# Patient Record
Sex: MALE | Race: BLACK OR AFRICAN AMERICAN | Employment: OTHER | URBAN - METROPOLITAN AREA
[De-identification: names, ages, dates, MRNs, and addresses within clinical notes are randomized per-mention and may not be internally consistent; named-entity substitution may affect disease eponyms.]

---

## 2018-11-29 ENCOUNTER — OFFICE VISIT (OUTPATIENT)
Dept: INTERNAL MEDICINE CLINIC | Age: 48
End: 2018-11-29

## 2018-11-29 VITALS
RESPIRATION RATE: 18 BRPM | DIASTOLIC BLOOD PRESSURE: 96 MMHG | HEIGHT: 65 IN | BODY MASS INDEX: 34.14 KG/M2 | TEMPERATURE: 97.8 F | SYSTOLIC BLOOD PRESSURE: 173 MMHG | WEIGHT: 204.9 LBS | HEART RATE: 81 BPM | OXYGEN SATURATION: 97 %

## 2018-11-29 DIAGNOSIS — E11.9 TYPE 2 DIABETES MELLITUS WITHOUT COMPLICATION, WITHOUT LONG-TERM CURRENT USE OF INSULIN (HCC): ICD-10-CM

## 2018-11-29 DIAGNOSIS — F32.A DEPRESSION, UNSPECIFIED DEPRESSION TYPE: ICD-10-CM

## 2018-11-29 DIAGNOSIS — Z13.31 SCREENING FOR DEPRESSION: ICD-10-CM

## 2018-11-29 DIAGNOSIS — R35.1 NOCTURIA: ICD-10-CM

## 2018-11-29 DIAGNOSIS — Z13.39 SCREENING FOR ALCOHOLISM: ICD-10-CM

## 2018-11-29 DIAGNOSIS — Z95.1 HX OF CABG: ICD-10-CM

## 2018-11-29 DIAGNOSIS — F31.9 BIPOLAR 1 DISORDER (HCC): ICD-10-CM

## 2018-11-29 DIAGNOSIS — Z00.00 MEDICARE ANNUAL WELLNESS VISIT, SUBSEQUENT: Primary | ICD-10-CM

## 2018-11-29 DIAGNOSIS — I10 ESSENTIAL HYPERTENSION: ICD-10-CM

## 2018-11-29 DIAGNOSIS — E78.00 HYPERCHOLESTEROLEMIA: ICD-10-CM

## 2018-11-29 DIAGNOSIS — Z12.11 SCREEN FOR COLON CANCER: ICD-10-CM

## 2018-11-29 RX ORDER — ISOSORBIDE MONONITRATE 30 MG/1
30 TABLET, EXTENDED RELEASE ORAL
COMMUNITY
Start: 2017-12-16 | End: 2018-11-29

## 2018-11-29 RX ORDER — VALSARTAN AND HYDROCHLOROTHIAZIDE 160; 12.5 MG/1; MG/1
1 TABLET, FILM COATED ORAL
COMMUNITY
End: 2018-11-29

## 2018-11-29 RX ORDER — OLMESARTAN MEDOXOMIL AND HYDROCHLOROTHIAZIDE 40/12.5 40; 12.5 MG/1; MG/1
1 TABLET ORAL DAILY
Qty: 30 TAB | Refills: 11 | Status: SHIPPED | OUTPATIENT
Start: 2018-11-29 | End: 2019-01-09 | Stop reason: CLARIF

## 2018-11-29 RX ORDER — INSULIN LISPRO 100 [IU]/ML
10 INJECTION, SUSPENSION SUBCUTANEOUS
Qty: 2 ADJUSTABLE DOSE PRE-FILLED PEN SYRINGE | Refills: 11 | Status: SHIPPED | OUTPATIENT
Start: 2018-11-29 | End: 2018-12-01 | Stop reason: SDUPTHER

## 2018-11-29 RX ORDER — METOPROLOL TARTRATE 50 MG/1
50 TABLET ORAL
COMMUNITY
Start: 2017-12-15 | End: 2018-11-29 | Stop reason: SDUPTHER

## 2018-11-29 RX ORDER — SILDENAFIL 100 MG/1
100 TABLET, FILM COATED ORAL AS NEEDED
Qty: 10 TAB | Refills: 11 | Status: SHIPPED | OUTPATIENT
Start: 2018-11-29 | End: 2019-09-17 | Stop reason: SINTOL

## 2018-11-29 RX ORDER — ATORVASTATIN CALCIUM 80 MG/1
80 TABLET, FILM COATED ORAL
COMMUNITY
End: 2018-11-29 | Stop reason: SDUPTHER

## 2018-11-29 RX ORDER — METOPROLOL TARTRATE 50 MG/1
50 TABLET ORAL 2 TIMES DAILY
Qty: 60 TAB | Refills: 11 | Status: SHIPPED | OUTPATIENT
Start: 2018-11-29 | End: 2019-09-17

## 2018-11-29 RX ORDER — INSULIN LISPRO 100 [IU]/ML
INJECTION, SOLUTION INTRAVENOUS; SUBCUTANEOUS
COMMUNITY
End: 2018-11-29 | Stop reason: CLARIF

## 2018-11-29 RX ORDER — GUAIFENESIN 100 MG/5ML
81 LIQUID (ML) ORAL
COMMUNITY
End: 2019-01-29

## 2018-11-29 RX ORDER — ATORVASTATIN CALCIUM 80 MG/1
80 TABLET, FILM COATED ORAL DAILY
Qty: 30 TAB | Refills: 11 | Status: SHIPPED | OUTPATIENT
Start: 2018-11-29

## 2018-11-29 NOTE — PROGRESS NOTES
SPORTS MEDICINE AND PRIMARY CARE Rao Haji MD, [address] [address] Phone:  [phone]  Fax: [phone] Chief Complaint Patient presents with [facility]i Annual Wellness Visit Stephani Conchis SUBJECTIVE: 
  Demetri Giron is a 46 y.o. male Patient returns today and is seen as a new patient for evaluation and ongoing care. He has a known history of bipolar I disorder, depression, diabetes, dyslipidemia, primary hypertension and is seen for evaluation. Patient comes in with his wife without new complaints and is seen for evaluation. Current Outpatient Medications Medication Sig Dispense Refill  aspirin 81 mg chewable tablet Take 81 mg by mouth.  insulin lispro  & lisp protamine, human, (HUMALOG MIX 75-25 KWIKPEN) 100 unit/mL (75-25) inpn 10 Units by SubCUTAneous route Before breakfast and dinner. 2 Adjustable Dose Pre-filled Pen Syringe 11  
 olmesartan-hydroCHLOROthiazide (BENICAR HCT) 40-12.5 mg per tablet Take 1 Tab by mouth daily. 30 Tab 11  
 sildenafil citrate (VIAGRA) 100 mg tablet Take 1 Tab by mouth as needed. 10 Tab 11  
 metoprolol tartrate (LOPRESSOR) 50 mg tablet Take 1 Tab by mouth two (2) times a day. 60 Tab 11  
 atorvastatin (LIPITOR) 80 mg tablet Take 1 Tab by mouth daily. 30 Tab 11 Past Medical History:  
Diagnosis Date  Bipolar 1 disorder ([facility]  Depression  Diabetes ([facility] 1995  Hx of CABG 2007  
 [facility] news  Hypercholesterolemia  Hypertension  PVD (peripheral vascular disease) ([facility] Past Surgical History:  
Procedure Laterality Date  HX APPENDECTOMY Allergies Allergen Reactions  Pcn [Penicillins] Itching REVIEW OF SYSTEMS: 
General: negative for - chills or fever ENT: negative for - headaches, nasal congestion or tinnitus Respiratory: negative for - cough, hemoptysis, shortness of breath or wheezing Cardiovascular : negative for - chest pain, edema, palpitations or shortness of breath Gastrointestinal: negative for - abdominal pain, blood in stools, heartburn or nausea/vomiting Genito-Urinary: no dysuria, trouble voiding, or hematuria Musculoskeletal: negative for - gait disturbance, joint pain, joint stiffness or joint swelling Neurological: no TIA or stroke symptoms Hematologic: no bruises, no bleeding, no swollen glands Integument: no lumps, mole changes, nail changes or rash Endocrine: no malaise/lethargy or unexpected weight changes Social History Socioeconomic History  Marital status:  Spouse name: Not on file  Number of children: Not on file  Years of education: Not on file  Highest education level: Not on file Tobacco Use  Smoking status: Never Smoker  Smokeless tobacco: Never Used Substance and Sexual Activity  Alcohol use: No  
  Frequency: Never  Drug use: No  
 Sexual activity: Yes  
  Partners: Female Birth control/protection: None Family History Problem Relation Age of Onset  Hypertension Mother  Diabetes Mother  Heart Disease Mother  Hypertension Father  Stroke Father   
Habits:  Lifetime nonsmoker, non drinker. Used crack cocaine 30 or more years ago. Social History:  The patient is , lives with his wife. He has a 32year old daughter and one grandson. He completed high school. He is a retired . Currently he is gainfully employed as a  at [employer]. Family History:  Father  52 of stroke. Mother  64 with diabetes and heart condition. 52year old brother  of a ruptured brain aneurysm. 50year old brother is alive and well. OBJECTIVE: 
 
Visit Vitals BP (!) 173/96 Pulse 81 Temp 97.8 °F (36.6 °C) (Oral) Resp 18 Ht 5' 5\" (1.651 m) Wt 204 lb 14.4 oz (92.9 kg) SpO2 97% BMI 34.10 kg/m² CONSTITUTIONAL: well , well nourished, appears age appropriate EYES: perrla, eom intact ENMT:moist mucous membranes, pharynx clear NECK: supple. Thyroid normal 
RESPIRATORY: Chest: clear bilaterally CARDIOVASCULAR: Heart: regular rate and rhythm GASTROINTESTINAL: Abdomen: soft, bowel sounds active HEMATOLOGIC: no pathological lymph nodes palpated MUSCULOSKELETAL: Extremities: no edema, pulse 1+ Foot exam reveals no lesions, pulses are absent, sensation is intact to fine filament. INTEGUMENT: No unusual rashes or suspicious skin lesions noted. Nails appear normal. 
NEUROLOGIC: non-focal exam  
MENTAL STATUS: alert and oriented, appropriate affect ASSESSMENT: 
1. Medicare annual wellness visit, subsequent 2. Screening for alcoholism 3. Screening for depression 4. Type 2 diabetes mellitus without complication, without long-term current use of insulin ([facility] 5. Hypercholesterolemia 6. Essential hypertension 7. Bipolar 1 disorder ([facility] 8. Depression, unspecified depression type 9. Hx of CABG 10. Screen for colon cancer 11. Nocturia Her blood pressure control is atrocious. It is related to compliance issues. We will discontinue the Valsartan and place him on Benicar and ask him to come back in a week for a blood pressure check. We will assess blood sugar control with a Hgb A1c, as well as metabolic status, and report to him the results in the mail. When he comes back for his blood pressure check he will be bringing his blood sugar readings so we can help him adjust his insulin to induce a goal range blood sugar of between 110 and 140. We see that he is on a statin and we will titrate that for cholesterol control, but he has been out of all his medicines for at least a month, which we keep in mind as we look at his laboratory studies.   We will give him a glucose meter, but he will call his insurance company to find out which one they use. He will then call Shola Valdovinos back with the result and if it is not the one that we use we have given him he will bring that one back and we will call in a prescription for the one that insurance will pay for. We discuss the use of nitrates and Viagra . He prefers to stop the nitrates. He is not symptomatic with chest pain and so it is a comfortable decision for us at this time. Dr. Leni Rivers will speak on it further. I have discussed the diagnosis with the patient and the intended plan as seen in the 
orders above. The patient understands and agees with the plan. The patient has  
received an after visit summary and questions were answered concerning 
future plans Patient labs and/or xrays were reviewed Past records were reviewed. PLAN: 
. Orders Placed This Encounter  Depression Screen Annual  
 URINALYSIS W/ RFLX MICROSCOPIC  CBC WITH AUTOMATED DIFF  
 METABOLIC PANEL, COMPREHENSIVE  LIPID PANEL  
 PROSTATE SPECIFIC AG  
 HEMOGLOBIN A1C WITH EAG  
 REFERRAL FOR COLONOSCOPY  [facility]  
 AMB POC EKG ROUTINE W/ 12 LEADS, INTER & REP  
 DISCONTD: isosorbide mononitrate ER (IMDUR) 30 mg tablet  DISCONTD: metoprolol tartrate (LOPRESSOR) 50 mg tablet  DISCONTD: valsartan-hydroCHLOROthiazide (DIOVAN-HCT) 160-12.5 mg per tablet  aspirin 81 mg chewable tablet  DISCONTD: insulin lispro (HUMALOG KWIKPEN INSULIN) 100 unit/mL kwikpen  DISCONTD: atorvastatin (LIPITOR) 80 mg tablet  insulin lispro  & lisp protamine, human, (HUMALOG MIX 75-25 KWIKPEN) 100 unit/mL (75-25) inpn  olmesartan-hydroCHLOROthiazide (BENICAR HCT) 40-12.5 mg per tablet  sildenafil citrate (VIAGRA) 100 mg tablet  metoprolol tartrate (LOPRESSOR) 50 mg tablet  atorvastatin (LIPITOR) 80 mg tablet Follow-up Disposition: 
Return in about 1 week (around 12/6/2018) for bp check, blood sugar check.   
 
 
 
 
ATTENTION:  
 This medical record was transcribed using an electronic medical records system. Although proofread, it may and can contain electronic and spelling errors. Other human spelling and other errors may be present. Corrections may be executed at a later time. Please feel free to contact us for any clarifications as needed.

## 2018-11-29 NOTE — PROGRESS NOTES
1. Have you been to the ER, urgent care clinic since your last visit? Hospitalized since your last visit? No 
 
2. Have you seen or consulted any other health care providers outside of the [address] [place] [address] since your last visit? Include any pap smears or colon screening. No 
 
This is a \"Welcome to [employer]"  Initial Preventive Physical Examination (IPPE) providing Personalized Prevention Plan Services (Performed in the first 12 months of enrollment) I have reviewed the patient's medical history in detail and updated the computerized patient record. History Past Medical History:  
Diagnosis Date  Bipolar 1 disorder ([facility]  Depression  Diabetes ([facility]  Hypercholesterolemia  Hypertension Past Surgical History:  
Procedure Laterality Date  HX APPENDECTOMY Current Outpatient Medications Medication Sig Dispense Refill  isosorbide mononitrate ER (IMDUR) 30 mg tablet Take 30 mg by mouth.  metoprolol tartrate (LOPRESSOR) 50 mg tablet Take 50 mg by mouth.  valsartan-hydroCHLOROthiazide (DIOVAN-HCT) 160-12.5 mg per tablet Take 1 Tab by mouth.  aspirin 81 mg chewable tablet Take 81 mg by mouth.  insulin lispro (HUMALOG KWIKPEN INSULIN) 100 unit/mL kwikpen by SubCUTAneous route.  atorvastatin (LIPITOR) 80 mg tablet Take 80 mg by mouth. Allergies Allergen Reactions  Pcn [Penicillins] Itching No family history on file. Social History Tobacco Use  Smoking status: Not on file Substance Use Topics  Alcohol use: Not on file Diet, Lifestyle: No special diet Exercise level: moderately active Depression Risk Screen PHQ over the last two weeks 11/29/2018 Little interest or pleasure in doing things Not at all Feeling down, depressed, irritable, or hopeless Not at all Total Score PHQ 2 0 Alcohol Risk Screen You do not drink alcohol or very rarely. Functional Ability and Level of Safety Hearing Loss Hearing is good. Vision Screening Vision is good. No exam data present Activities of Daily Living The home contains: no safety equipment. Patient does total self care Fall Risk Screen No flowsheet data found. Abuse Screen Patient is not abused Screening EKG EKG order placed: Yes Patient Care Team  
Patient Care Team: 
Aracelis Plascencia MD as PCP - General (Internal Medicine) End of Life Planning Advanced care planning directives were discussed with the patient and /or family/caregiver. Assessment/Plan Education and counseling provided: 
Are appropriate based on today's review and evaluation Health Maintenance Due Topic Date Due  
 COLONOSCOPY  01/11/1986  MEDICARE YEARLY EXAM  11/26/2018

## 2018-11-29 NOTE — PATIENT INSTRUCTIONS
Medicare Wellness Visit, Male The best way to live healthy is to have a lifestyle where you eat a well-balanced diet, exercise regularly, limit alcohol use, and quit all forms of tobacco/nicotine, if applicable. Regular preventive services are another way to keep healthy. Preventive services (vaccines, screening tests, monitoring & exams) can help personalize your care plan, which helps you manage your own care. Screening tests can find health problems at the earliest stages, when they are easiest to treat. [address] [facility] follows the current, evidence-based guidelines published by the [facility] Ellis ([facility]) when recommending preventive services for our patients. Because we follow these guidelines, sometimes recommendations change over time as research supports it. (For example, a prostate screening blood test is no longer routinely recommended for men with no symptoms.) Of course, you and your doctor may decide to screen more often for some diseases, based on your risk and co-morbidities (chronic disease you are already diagnosed with). Preventive services for you include: - Medicare offers their members a free annual wellness visit, which is time for you and your primary care provider to discuss and plan for your preventive service needs. Take advantage of this benefit every year! 
-All adults over age 72 should receive the recommended pneumonia vaccines. Current USPSTF guidelines recommend a series of two vaccines for the best pneumonia protection.  
-All adults should have a flu vaccine yearly and an ECG.  All adults age 61 and older should receive a shingles vaccine once in their lifetime.   
-All adults age 38-68 who are overweight should have a diabetes screening test once every three years.  
-Other screening tests & preventive services for persons with diabetes include: an eye exam to screen for diabetic retinopathy, a kidney function test, a foot exam, and stricter control over your cholesterol.  
-Cardiovascular screening for adults with routine risk involves an electrocardiogram (ECG) at intervals determined by the provider.  
-Colorectal cancer screening should be done for adults age 54-65 with no increased risk factors for colorectal cancer. There are a number of acceptable methods of screening for this type of cancer. Each test has its own benefits and drawbacks. Discuss with your provider what is most appropriate for you during your annual wellness visit. The different tests include: colonoscopy (considered the best screening method), a fecal occult blood test, a fecal DNA test, and sigmoidoscopy. 
-All adults born between [facility] should be screened once for Hepatitis C. 
-An Abdominal Aortic Aneurysm (AAA) Screening is recommended for men age 73-68 who has ever smoked in their lifetime. Here is a list of your current Health Maintenance items (your personalized list of preventive services) with a due date: 
Health Maintenance Due Topic Date Due  
 Colonoscopy  01/11/1986 Bartolo Ibarra Annual Well Visit  11/26/2018

## 2018-11-30 LAB
ALBUMIN SERPL-MCNC: 4.1 G/DL (ref 3.5–5.5)
ALBUMIN/GLOB SERPL: 1.6 {RATIO} (ref 1.2–2.2)
ALP SERPL-CCNC: 81 IU/L (ref 39–117)
ALT SERPL-CCNC: 23 IU/L (ref 0–44)
APPEARANCE UR: CLEAR
AST SERPL-CCNC: 17 IU/L (ref 0–40)
BASOPHILS # BLD AUTO: 0 X10E3/UL (ref 0–0.2)
BASOPHILS NFR BLD AUTO: 0 %
BILIRUB SERPL-MCNC: 0.4 MG/DL (ref 0–1.2)
BILIRUB UR QL STRIP: NEGATIVE
BUN SERPL-MCNC: 13 MG/DL (ref 6–24)
BUN/CREAT SERPL: 13 (ref 9–20)
CALCIUM SERPL-MCNC: 9.1 MG/DL (ref 8.7–10.2)
CHLORIDE SERPL-SCNC: 97 MMOL/L (ref 96–106)
CHOLEST SERPL-MCNC: 173 MG/DL (ref 100–199)
CO2 SERPL-SCNC: 27 MMOL/L (ref 20–29)
COLOR UR: YELLOW
CREAT SERPL-MCNC: 1.04 MG/DL (ref 0.76–1.27)
EOSINOPHIL # BLD AUTO: 0.5 X10E3/UL (ref 0–0.4)
EOSINOPHIL NFR BLD AUTO: 8 %
ERYTHROCYTE [DISTWIDTH] IN BLOOD BY AUTOMATED COUNT: 13.9 % (ref 12.3–15.4)
EST. AVERAGE GLUCOSE BLD GHB EST-MCNC: 235 MG/DL
GLOBULIN SER CALC-MCNC: 2.5 G/DL (ref 1.5–4.5)
GLUCOSE SERPL-MCNC: 254 MG/DL (ref 65–99)
GLUCOSE UR QL: NEGATIVE
HBA1C MFR BLD: 9.8 % (ref 4.8–5.6)
HCT VFR BLD AUTO: 46.6 % (ref 37.5–51)
HDLC SERPL-MCNC: 44 MG/DL
HGB BLD-MCNC: 15.7 G/DL (ref 13–17.7)
HGB UR QL STRIP: NEGATIVE
IMM GRANULOCYTES # BLD: 0 X10E3/UL (ref 0–0.1)
IMM GRANULOCYTES NFR BLD: 0 %
KETONES UR QL STRIP: NEGATIVE
LDLC SERPL CALC-MCNC: 104 MG/DL (ref 0–99)
LEUKOCYTE ESTERASE UR QL STRIP: NEGATIVE
LYMPHOCYTES # BLD AUTO: 2.8 X10E3/UL (ref 0.7–3.1)
LYMPHOCYTES NFR BLD AUTO: 41 %
MCH RBC QN AUTO: 28.5 PG (ref 26.6–33)
MCHC RBC AUTO-ENTMCNC: 33.7 G/DL (ref 31.5–35.7)
MCV RBC AUTO: 85 FL (ref 79–97)
MICRO URNS: NORMAL
MONOCYTES # BLD AUTO: 0.4 X10E3/UL (ref 0.1–0.9)
MONOCYTES NFR BLD AUTO: 6 %
NEUTROPHILS # BLD AUTO: 3.1 X10E3/UL (ref 1.4–7)
NEUTROPHILS NFR BLD AUTO: 45 %
NITRITE UR QL STRIP: NEGATIVE
PH UR STRIP: 7 [PH] (ref 5–7.5)
PLATELET # BLD AUTO: 211 X10E3/UL (ref 150–379)
POTASSIUM SERPL-SCNC: 4 MMOL/L (ref 3.5–5.2)
PROT SERPL-MCNC: 6.6 G/DL (ref 6–8.5)
PROT UR QL STRIP: NEGATIVE
PSA SERPL-MCNC: 1.2 NG/ML (ref 0–4)
RBC # BLD AUTO: 5.5 X10E6/UL (ref 4.14–5.8)
SODIUM SERPL-SCNC: 138 MMOL/L (ref 134–144)
SP GR UR: 1.02 (ref 1–1.03)
TRIGL SERPL-MCNC: 123 MG/DL (ref 0–149)
UROBILINOGEN UR STRIP-MCNC: 0.2 MG/DL (ref 0.2–1)
VLDLC SERPL CALC-MCNC: 25 MG/DL (ref 5–40)
WBC # BLD AUTO: 6.8 X10E3/UL (ref 3.4–10.8)

## 2018-12-01 RX ORDER — INSULIN ASPART 100 [IU]/ML
INJECTION, SUSPENSION SUBCUTANEOUS
Qty: 15 ML | Refills: 11 | Status: SHIPPED | OUTPATIENT
Start: 2018-12-01 | End: 2019-01-23 | Stop reason: SDUPTHER

## 2018-12-03 RX ORDER — TRAZODONE HYDROCHLORIDE 50 MG/1
50 TABLET ORAL
Qty: 30 TAB | Refills: 6 | Status: SHIPPED | OUTPATIENT
Start: 2018-12-03 | End: 2019-01-09 | Stop reason: SDUPTHER

## 2018-12-06 ENCOUNTER — CLINICAL SUPPORT (OUTPATIENT)
Dept: INTERNAL MEDICINE CLINIC | Age: 48
End: 2018-12-06

## 2018-12-06 VITALS — BODY MASS INDEX: 34.66 KG/M2 | HEIGHT: 65 IN | WEIGHT: 208 LBS

## 2018-12-06 DIAGNOSIS — I10 ESSENTIAL HYPERTENSION: Primary | ICD-10-CM

## 2018-12-06 RX ORDER — AMLODIPINE BESYLATE 5 MG/1
5 TABLET ORAL DAILY
Qty: 30 TAB | Refills: 11 | Status: SHIPPED | OUTPATIENT
Start: 2018-12-06 | End: 2019-09-17

## 2018-12-06 NOTE — PROGRESS NOTES
Patient came in today for a bp check. Patient states he is taking:  Olmesartan-hydrochlorothiazide 40-12.5 mg 1 tab PO daily  Metoprolol tartrate 50 mg 1 tab PO 2x daily  Patient had a bp reading of 188/106   Per Dr. Carrie Corbin patient is to start taking Amlodipine 5 mg 1 tab PO daily as well as   Olmesartan-hydrochlorothiazide 40-12.5 mg 1 tab PO daily  Metoprolol tartrate 50 mg 1 tab PO 2x daily  Patient is to return in 1 week for bp check.     Yanna Santos

## 2019-01-09 ENCOUNTER — OFFICE VISIT (OUTPATIENT)
Dept: INTERNAL MEDICINE CLINIC | Age: 49
End: 2019-01-09

## 2019-01-09 VITALS
WEIGHT: 207.3 LBS | DIASTOLIC BLOOD PRESSURE: 84 MMHG | HEIGHT: 65 IN | BODY MASS INDEX: 34.54 KG/M2 | TEMPERATURE: 98.6 F | HEART RATE: 72 BPM | SYSTOLIC BLOOD PRESSURE: 160 MMHG | RESPIRATION RATE: 18 BRPM

## 2019-01-09 DIAGNOSIS — E11.9 TYPE 2 DIABETES MELLITUS WITHOUT COMPLICATION, WITHOUT LONG-TERM CURRENT USE OF INSULIN (HCC): Primary | ICD-10-CM

## 2019-01-09 DIAGNOSIS — G47.33 OSA (OBSTRUCTIVE SLEEP APNEA): ICD-10-CM

## 2019-01-09 DIAGNOSIS — F31.9 BIPOLAR 1 DISORDER (HCC): ICD-10-CM

## 2019-01-09 DIAGNOSIS — F32.A DEPRESSION, UNSPECIFIED DEPRESSION TYPE: ICD-10-CM

## 2019-01-09 DIAGNOSIS — E78.00 HYPERCHOLESTEROLEMIA: ICD-10-CM

## 2019-01-09 DIAGNOSIS — I10 ESSENTIAL HYPERTENSION: ICD-10-CM

## 2019-01-09 RX ORDER — TRAZODONE HYDROCHLORIDE 50 MG/1
50 TABLET ORAL
Qty: 30 TAB | Refills: 6 | Status: SHIPPED | OUTPATIENT
Start: 2019-01-09

## 2019-01-09 RX ORDER — TELMISARTAN AND HYDROCHLORTHIAZIDE 80; 25 MG/1; MG/1
1 TABLET ORAL DAILY
Qty: 30 TAB | Refills: 11 | Status: SHIPPED | OUTPATIENT
Start: 2019-01-09 | End: 2019-01-29

## 2019-01-09 NOTE — PROGRESS NOTES
SPORTS MEDICINE AND PRIMARY CARE  Helder Dey MD, [address] [address]  Phone:  [phone]  Fax: [phone]       Chief Complaint   Patient presents with    Hypertension   . SUBJECTIVE:    Arna Favre is a 46 y.o. male Patient returns today with known history of primary hypertension, diabetes, whose control has been poor with hemoglobin A1c of 9.8 on 11/29, and he is unable to tell me his blood sugar readings because he does not have a glucometer or test strips, primary hypertension, for which he stopped the Benicar as the insurance no longer paid for it and did not take any medications this morning, and comes in complaining of trouble sleeping at night and he is snoring at night. Patient is seen for evaluation. No other new complaints. He has an appointment to see Dr. Jensen Lopez for colonoscopy on 01/30/19. Current Outpatient Medications   Medication Sig Dispense Refill    traZODone (DESYREL) 50 mg tablet Take 1 Tab by mouth nightly. 30 Tab 6    telmisartan-hydroCHLOROthiazide (MICARDIS HCT) 80-25 mg per tablet Take 1 Tab by mouth daily. 30 Tab 11    amLODIPine (NORVASC) 5 mg tablet Take 1 Tab by mouth daily. 30 Tab 11    insulin aspart protamine/insulin aspart (NOVOLOG MIX 70/30) 100 unit/mL (70-30) inpn INJECT 10 UNITS AC BREAKFAST AND DINNER 15 mL 11    aspirin 81 mg chewable tablet Take 81 mg by mouth.  sildenafil citrate (VIAGRA) 100 mg tablet Take 1 Tab by mouth as needed. 10 Tab 11    metoprolol tartrate (LOPRESSOR) 50 mg tablet Take 1 Tab by mouth two (2) times a day. 60 Tab 11    atorvastatin (LIPITOR) 80 mg tablet Take 1 Tab by mouth daily.  27 Tab 11     Past Medical History:   Diagnosis Date    B[facility] ([facility]     Depression     Diabetes ([facility] 1995    Hx of CABG 2007    [facility] news    Hypercholesterolemia     Hypertension     PVD (peripheral vascular disease) ([facility]      Past Surgical History:   Procedure Laterality Date    HX APPENDECTOMY       Allergies   Allergen Reactions    Pcn [Penicillins] Itching         REVIEW OF SYSTEMS:  General: negative for - chills or fever  ENT: negative for - headaches, nasal congestion or tinnitus  Respiratory: negative for - cough, hemoptysis, shortness of breath or wheezing  Cardiovascular : negative for - chest pain, edema, palpitations or shortness of breath  Gastrointestinal: negative for - abdominal pain, blood in stools, heartburn or nausea/vomiting  Genito-Urinary: no dysuria, trouble voiding, or hematuria  Musculoskeletal: negative for - gait disturbance, joint pain, joint stiffness or joint swelling  Neurological: no TIA or stroke symptoms  Hematologic: no bruises, no bleeding, no swollen glands  Integument: no lumps, mole changes, nail changes or rash  Endocrine: no malaise/lethargy or unexpected weight changes      Social History     Socioeconomic History    Marital status:      Spouse name: Not on file    Number of children: Not on file    Years of education: Not on file    Highest education level: Not on file   Tobacco Use    Smoking status: Never Smoker    Smokeless tobacco: Never Used   Substance and Sexual Activity    Alcohol use: No     Frequency: Never    Drug use: No    Sexual activity: Yes     Partners: Female     Birth control/protection: None   Social History Narrative    Habits:  Lifetime nonsmoker, non drinker. Used crack cocaine 30 or more years ago.         Social History:  The patient is , lives with his wife. He has a 32year old daughter and one grandson. He completed high school. He is a retired . Currently he is gainfully employed as a  at [address].         Family History:  Father  52 of stroke. Mother  64 with diabetes and heart condition. 52year old brother  of a ruptured brain aneurysm. 50year old brother is alive and well.              Family History   Problem Relation Age of Onset    Hypertension Mother     Diabetes Mother     Heart Disease Mother     Hypertension Father     Stroke Father        OBJECTIVE:    Visit Vitals  /84 (BP 1 Location: Right arm, BP Patient Position: At rest)   Pulse 72   Temp 98.6 °F (37 °C)   Resp 18   Ht 5' 5\" (1.651 m)   Wt 207 lb 4.8 oz (94 kg)   BMI 34.50 kg/m²     CONSTITUTIONAL: well , well nourished, appears age appropriate  EYES: perrla, eom intact  ENMT:moist mucous membranes, pharynx clear  NECK: supple. Thyroid normal  RESPIRATORY: Chest: clear bilaterally   CARDIOVASCULAR: Heart: regular rate and rhythm  GASTROINTESTINAL: Abdomen: soft, bowel sounds active  HEMATOLOGIC: no pathological lymph nodes palpated  MUSCULOSKELETAL: Extremities: no edema, pulse 1+   INTEGUMENT: No unusual rashes or suspicious skin lesions noted. Nails appear normal.  NEUROLOGIC: non-focal exam   MENTAL STATUS: alert and oriented, appropriate affect           ASSESSMENT:  1. Type 2 diabetes mellitus without complication, without long-term current use of insulin ([facility]    2. Hypercholesterolemia    3. Essential hypertension    4. Bipolar 1 disorder ([facility]    5. Depression, unspecified depression type    6. SHIRLEY (obstructive sleep apnea)      We will stop the Benicar and use Micardis for his pressure control and ask him to come by in two weeks for blood pressure check and at that time we will also check hemoglobin A1c to make a determination if that is going to be adequate to control his blood pressure along with the Amlodipine and Metoprolol. I suspect it will. He is going to call us back and tell us the meter that the insurance will pay for and we will send him a prescription for new supplies for that particular meter to his pharmacist.    I suspect he has obstructive sleep apnea and will arrange for a sleep study.   He will be by in two weeks for a blood pressure check and a hemoglobin A1c and see me formally in three months. I have discussed the diagnosis with the patient and the intended plan as seen in the  orders above. The patient understands and agees with the plan. The patient has   received an after visit summary and questions were answered concerning  future plans  Patient labs and/or xrays were reviewed  Past records were reviewed. PLAN:  .  Orders Placed This Encounter    REFERRAL TO SLEEP STUDIES    traZODone (DESYREL) 50 mg tablet    telmisartan-hydroCHLOROthiazide (MICARDIS HCT) 80-25 mg per tablet       Follow-up Disposition:  Return in about 2 weeks (around 1/23/2019) for bp check, blood sugar check, hba1c. ATTENTION:   This medical record was transcribed using an electronic medical records system. Although proofread, it may and can contain electronic and spelling errors. Other human spelling and other errors may be present. Corrections may be executed at a later time. Please feel free to contact us for any clarifications as needed.

## 2019-01-23 ENCOUNTER — CLINICAL SUPPORT (OUTPATIENT)
Dept: INTERNAL MEDICINE CLINIC | Age: 49
End: 2019-01-23

## 2019-01-23 VITALS
WEIGHT: 208.3 LBS | DIASTOLIC BLOOD PRESSURE: 94 MMHG | HEART RATE: 75 BPM | HEIGHT: 65 IN | BODY MASS INDEX: 34.7 KG/M2 | SYSTOLIC BLOOD PRESSURE: 150 MMHG

## 2019-01-23 DIAGNOSIS — E11.9 TYPE 2 DIABETES MELLITUS WITHOUT COMPLICATION, WITHOUT LONG-TERM CURRENT USE OF INSULIN (HCC): Primary | ICD-10-CM

## 2019-01-23 LAB — GLUCOSE POC: 304 MG/DL

## 2019-01-23 RX ORDER — HYDRALAZINE HYDROCHLORIDE 25 MG/1
25 TABLET, FILM COATED ORAL 2 TIMES DAILY
Qty: 60 TAB | Refills: 11 | Status: SHIPPED | OUTPATIENT
Start: 2019-01-23

## 2019-01-23 RX ORDER — INSULIN PUMP SYRINGE, 3 ML
EACH MISCELLANEOUS
Qty: 1 KIT | Refills: 0 | Status: SHIPPED | OUTPATIENT
Start: 2019-01-23

## 2019-01-23 RX ORDER — LANCETS 33 GAUGE
EACH MISCELLANEOUS
Qty: 100 LANCET | Refills: 11 | Status: SHIPPED | OUTPATIENT
Start: 2019-01-23

## 2019-01-23 NOTE — PROGRESS NOTES
Pt came in today for a bp check. Pt states he is taking  Amlodipine 5 mg 1 tab PO daily   Metoprolol tartrate 50 mg 1 tab PO 2x daily  Telmisartan-hydrochlorothiazide 80-25 mg 1 tab PO daily  Weight-208.4lb  Pulse-75  Bp-150/94  Per Dr. Raffaele Du pt is to start taking hydralazine 25 mg 1 tab 2x daily along with other bp meds directed above. Dr. Raffaele Du had the pt blood sugar check in office. BS- 304  Per Dr. Raffaele Du pt is to start taking insulin 16 units twice a day and start checking blood sugar at home and recording it. Pt is to return in 1 week for another blood sugar check.       Indigo Murray

## 2019-01-26 RX ORDER — INSULIN ASPART 100 [IU]/ML
INJECTION, SUSPENSION SUBCUTANEOUS
Qty: 15 ML | Refills: 11 | Status: SHIPPED | OUTPATIENT
Start: 2019-01-26 | End: 2019-02-04 | Stop reason: CLARIF

## 2019-01-29 RX ORDER — ASPIRIN 325 MG
325 TABLET ORAL DAILY
COMMUNITY
End: 2019-10-17

## 2019-01-29 RX ORDER — GLUCOSAMINE SULFATE 1500 MG
1000 POWDER IN PACKET (EA) ORAL DAILY
COMMUNITY

## 2019-01-30 ENCOUNTER — HOSPITAL ENCOUNTER (OUTPATIENT)
Age: 49
Setting detail: OUTPATIENT SURGERY
Discharge: HOME | End: 2019-01-30
Attending: SPECIALIST | Admitting: SPECIALIST
Payer: MEDICARE

## 2019-01-30 ENCOUNTER — ANESTHESIA (OUTPATIENT)
Dept: ENDOSCOPY | Age: 49
End: 2019-01-30
Payer: MEDICARE

## 2019-01-30 ENCOUNTER — ANESTHESIA EVENT (OUTPATIENT)
Dept: ENDOSCOPY | Age: 49
End: 2019-01-30
Payer: MEDICARE

## 2019-01-30 VITALS
RESPIRATION RATE: 14 BRPM | BODY MASS INDEX: 34.33 KG/M2 | HEIGHT: 65 IN | SYSTOLIC BLOOD PRESSURE: 151 MMHG | TEMPERATURE: 97.5 F | DIASTOLIC BLOOD PRESSURE: 78 MMHG | OXYGEN SATURATION: 98 % | WEIGHT: 206.06 LBS | HEART RATE: 78 BPM

## 2019-01-30 LAB
GLUCOSE BLD STRIP.AUTO-MCNC: 208 MG/DL (ref 65–100)
SERVICE CMNT-IMP: ABNORMAL

## 2019-01-30 PROCEDURE — 88305 TISSUE EXAM BY PATHOLOGIST: CPT

## 2019-01-30 PROCEDURE — 76040000019: Performed by: SPECIALIST

## 2019-01-30 PROCEDURE — 77030010937 HC CLP LIG BSC -I: Performed by: SPECIALIST

## 2019-01-30 PROCEDURE — 74011250636 HC RX REV CODE- 250/636: Performed by: SPECIALIST

## 2019-01-30 PROCEDURE — 74011250636 HC RX REV CODE- 250/636

## 2019-01-30 PROCEDURE — 76060000031 HC ANESTHESIA FIRST 0.5 HR: Performed by: SPECIALIST

## 2019-01-30 PROCEDURE — 74011000250 HC RX REV CODE- 250

## 2019-01-30 PROCEDURE — 82962 GLUCOSE BLOOD TEST: CPT

## 2019-01-30 PROCEDURE — 77030013992 HC SNR POLYP ENDOSC BSC -B: Performed by: SPECIALIST

## 2019-01-30 PROCEDURE — 77030027957 HC TBNG IRR ENDOGTR BUSS -B: Performed by: SPECIALIST

## 2019-01-30 RX ORDER — GLYCOPYRROLATE 0.2 MG/ML
INJECTION INTRAMUSCULAR; INTRAVENOUS AS NEEDED
Status: DISCONTINUED | OUTPATIENT
Start: 2019-01-30 | End: 2019-01-30 | Stop reason: HOSPADM

## 2019-01-30 RX ORDER — SODIUM CHLORIDE 9 MG/ML
INJECTION, SOLUTION INTRAVENOUS
Status: DISCONTINUED | OUTPATIENT
Start: 2019-01-30 | End: 2019-01-30 | Stop reason: HOSPADM

## 2019-01-30 RX ORDER — MIDAZOLAM HYDROCHLORIDE 1 MG/ML
.25-1 INJECTION, SOLUTION INTRAMUSCULAR; INTRAVENOUS
Status: DISCONTINUED | OUTPATIENT
Start: 2019-01-30 | End: 2019-01-30 | Stop reason: HOSPADM

## 2019-01-30 RX ORDER — SODIUM CHLORIDE 9 MG/ML
50 INJECTION, SOLUTION INTRAVENOUS CONTINUOUS
Status: DISCONTINUED | OUTPATIENT
Start: 2019-01-30 | End: 2019-01-30 | Stop reason: HOSPADM

## 2019-01-30 RX ORDER — LORAZEPAM 2 MG/ML
2 INJECTION INTRAMUSCULAR AS NEEDED
Status: DISCONTINUED | OUTPATIENT
Start: 2019-01-30 | End: 2019-01-30 | Stop reason: HOSPADM

## 2019-01-30 RX ORDER — NALOXONE HYDROCHLORIDE 0.4 MG/ML
0.4 INJECTION, SOLUTION INTRAMUSCULAR; INTRAVENOUS; SUBCUTANEOUS
Status: DISCONTINUED | OUTPATIENT
Start: 2019-01-30 | End: 2019-01-30 | Stop reason: HOSPADM

## 2019-01-30 RX ORDER — EPINEPHRINE 0.1 MG/ML
1 INJECTION INTRACARDIAC; INTRAVENOUS
Status: DISCONTINUED | OUTPATIENT
Start: 2019-01-30 | End: 2019-01-30 | Stop reason: HOSPADM

## 2019-01-30 RX ORDER — DEXTROMETHORPHAN/PSEUDOEPHED 2.5-7.5/.8
1.2 DROPS ORAL
Status: DISCONTINUED | OUTPATIENT
Start: 2019-01-30 | End: 2019-01-30 | Stop reason: HOSPADM

## 2019-01-30 RX ORDER — LIDOCAINE HYDROCHLORIDE 20 MG/ML
INJECTION, SOLUTION EPIDURAL; INFILTRATION; INTRACAUDAL; PERINEURAL AS NEEDED
Status: DISCONTINUED | OUTPATIENT
Start: 2019-01-30 | End: 2019-01-30 | Stop reason: HOSPADM

## 2019-01-30 RX ORDER — FENTANYL CITRATE 50 UG/ML
200 INJECTION, SOLUTION INTRAMUSCULAR; INTRAVENOUS
Status: DISCONTINUED | OUTPATIENT
Start: 2019-01-30 | End: 2019-01-30 | Stop reason: HOSPADM

## 2019-01-30 RX ORDER — SODIUM CHLORIDE 0.9 % (FLUSH) 0.9 %
5-40 SYRINGE (ML) INJECTION AS NEEDED
Status: DISCONTINUED | OUTPATIENT
Start: 2019-01-30 | End: 2019-01-30 | Stop reason: HOSPADM

## 2019-01-30 RX ORDER — FLUMAZENIL 0.1 MG/ML
0.2 INJECTION INTRAVENOUS
Status: DISCONTINUED | OUTPATIENT
Start: 2019-01-30 | End: 2019-01-30 | Stop reason: HOSPADM

## 2019-01-30 RX ORDER — PROPOFOL 10 MG/ML
INJECTION, EMULSION INTRAVENOUS AS NEEDED
Status: DISCONTINUED | OUTPATIENT
Start: 2019-01-30 | End: 2019-01-30 | Stop reason: HOSPADM

## 2019-01-30 RX ORDER — SODIUM CHLORIDE 0.9 % (FLUSH) 0.9 %
5-40 SYRINGE (ML) INJECTION EVERY 8 HOURS
Status: DISCONTINUED | OUTPATIENT
Start: 2019-01-30 | End: 2019-01-30 | Stop reason: HOSPADM

## 2019-01-30 RX ORDER — ATROPINE SULFATE 0.1 MG/ML
0.5 INJECTION INTRAVENOUS
Status: DISCONTINUED | OUTPATIENT
Start: 2019-01-30 | End: 2019-01-30 | Stop reason: HOSPADM

## 2019-01-30 RX ADMIN — PROPOFOL 50 MG: 10 INJECTION, EMULSION INTRAVENOUS at 08:07

## 2019-01-30 RX ADMIN — PROPOFOL 50 MG: 10 INJECTION, EMULSION INTRAVENOUS at 08:24

## 2019-01-30 RX ADMIN — PROPOFOL 50 MG: 10 INJECTION, EMULSION INTRAVENOUS at 08:18

## 2019-01-30 RX ADMIN — SODIUM CHLORIDE: 9 INJECTION, SOLUTION INTRAVENOUS at 08:04

## 2019-01-30 RX ADMIN — PROPOFOL 50 MG: 10 INJECTION, EMULSION INTRAVENOUS at 08:14

## 2019-01-30 RX ADMIN — PROPOFOL 50 MG: 10 INJECTION, EMULSION INTRAVENOUS at 08:13

## 2019-01-30 RX ADMIN — PROPOFOL 50 MG: 10 INJECTION, EMULSION INTRAVENOUS at 08:05

## 2019-01-30 RX ADMIN — PROPOFOL 50 MG: 10 INJECTION, EMULSION INTRAVENOUS at 08:28

## 2019-01-30 RX ADMIN — PROPOFOL 50 MG: 10 INJECTION, EMULSION INTRAVENOUS at 08:19

## 2019-01-30 RX ADMIN — GLYCOPYRROLATE 0.1 MG: 0.2 INJECTION INTRAMUSCULAR; INTRAVENOUS at 08:04

## 2019-01-30 RX ADMIN — LIDOCAINE HYDROCHLORIDE 100 MG: 20 INJECTION, SOLUTION EPIDURAL; INFILTRATION; INTRACAUDAL; PERINEURAL at 08:05

## 2019-01-30 RX ADMIN — PROPOFOL 50 MG: 10 INJECTION, EMULSION INTRAVENOUS at 08:32

## 2019-01-30 RX ADMIN — PROPOFOL 50 MG: 10 INJECTION, EMULSION INTRAVENOUS at 08:08

## 2019-01-30 RX ADMIN — PROPOFOL 50 MG: 10 INJECTION, EMULSION INTRAVENOUS at 08:10

## 2019-01-30 NOTE — ANESTHESIA POSTPROCEDURE EVALUATION
Procedure(s): 
COLONOSCOPY 
ENDOSCOPIC POLYPECTOMY RESOLUTION CLIP. 
 
<BSHSIANPOST> Visit Vitals /64 Pulse 83 Temp 36.4 °C (97.5 °F) Resp 12 Ht 5' 5\" (1.651 m) Wt 93.5 kg (206 lb 1 oz) SpO2 99% BMI 34.29 kg/m²

## 2019-01-30 NOTE — H&P
Pre-endoscopy H and P for Colonoscopy The patient was seen and examined. Date of last colonoscopy: never, Polyps  No   
 
The airway was assessed and documented. The problem list, past medical history, and medications were reviewed. Patient Active Problem List  
Diagnosis Code  Diabetes ([facility] E11.9  Hypercholesterolemia E78.00  Hypertension I10  Bipolar 1 disorder (HCC) F31.9  Depression F32.9  
 Hx of CABG Z95.1  PVD (peripheral vascular disease) (HCC) I73.9  SHIRLEY (obstructive sleep apnea) G47.33 Social History Socioeconomic History  Marital status:  Spouse name: Not on file  Number of children: Not on file  Years of education: Not on file  Highest education level: Not on file Social Needs  Financial resource strain: Not on file  Food insecurity - worry: Not on file  Food insecurity - inability: Not on file  Transportation needs - medical: Not on file  Transportation needs - non-medical: Not on file Occupational History  Not on file Tobacco Use  Smoking status: Never Smoker  Smokeless tobacco: Never Used Substance and Sexual Activity  Alcohol use: No  
  Frequency: Never  Drug use: No  
 Sexual activity: Yes  
  Partners: Female Birth control/protection: None Other Topics Concern  Not on file Social History Narrative Habits:  Lifetime nonsmoker, non drinker. Used crack cocaine 30 or more years ago.  
    
 Social History:  The patient is , lives with his wife. He has a 32year old daughter and one grandson. He completed high school. He is a retired . Currently he is gainfully employed as a  at [employer].  
    
 Family History:  Father  52 of stroke. Mother  64 with diabetes and heart condition. 52year old brother  of a ruptured brain aneurysm. 50year old brother is alive and well. Past Medical History: Diagnosis Date  Bipolar 1 disorder ([facility]  CAD (coronary artery disease)  Depression  Diabetes ([facility]  GERD (gastroesophageal reflux disease)  Hx of CABG 2007  
 [facility] news  Hypercholesterolemia  Hypertension  Ill-defined condition   
 sleep apnea test scheduled for Feb 2019  PVD (peripheral vascular disease) ([facility] The patient has a family history of na Prior to Admission Medications Prescriptions Last Dose Informant Patient Reported? Taking? Blood-Glucose Meter (ONETOUCH ULTRA2) monitoring kit 1/29/2019 at Unknown time  No Yes Sig: USE TO TEST BLOOD SUGAR THREE TIMES A DAY. DX.E11.9  
amLODIPine (NORVASC) 5 mg tablet 1/30/2019 at Unknown time  No Yes Sig: Take 1 Tab by mouth daily. aspirin (ASPIRIN) 325 mg tablet 1/28/2019  Yes Yes Sig: Take 325 mg by mouth daily. atorvastatin (LIPITOR) 80 mg tablet 1/28/2019  No Yes Sig: Take 1 Tab by mouth daily. cholecalciferol (VITAMIN D3) 1,000 unit cap 1/28/2019  Yes Yes Sig: Take 1,000 Units by mouth daily. glucose blood VI test strips (ONETOUCH ULTRA BLUE TEST STRIP) strip 1/29/2019 at Unknown time  No Yes Sig: USE TO TEST BLOOD SUGAR THREE TIMES A DAY. DX.E11.9  
hydrALAZINE (APRESOLINE) 25 mg tablet Not Taking at Unknown time  No No  
Sig: Take 1 Tab by mouth two (2) times a day. insulin aspart protamine/insulin aspart (NOVOLOG MIX 70/30) 100 unit/mL (70-30) inpn 1/28/2019 at Unknown time  No Yes Sig: INJECT 10 UNITS AC BREAKFAST AND DINNER  
lancets (ONE TOUCH DELICA) 33 gauge [facility] [place] at Unknown time  No Yes Sig: USE TO TEST BLOOD SUGAR THREE TIMES A DAY. DX.E11.9  
metoprolol tartrate (LOPRESSOR) 50 mg tablet 1/30/2019 at Unknown time  No Yes Sig: Take 1 Tab by mouth two (2) times a day. multivit-min/folic/vit K/lycop (MEN'S MULTIVITAMIN PO) 1/29/2019  Yes Yes Sig: Take 1 Tab by mouth daily. sildenafil citrate (VIAGRA) 100 mg tablet 12/30/2018 at Unknown time  No Yes Sig: Take 1 Tab by mouth as needed. traZODone (DESYREL) 50 mg tablet 1/28/2019  No Yes Sig: Take 1 Tab by mouth nightly. Facility-Administered Medications: None The review of systems is:  negative for shortness of breath or chest pain The heart, lungs and mental status were satisfactory for the administration of MAC sedation and for the procedure. Mallampati score: See Anesthesia. I discussed with the patient the objectives, risks, consequences and alternatives to the procedure. Plan: Endoscopic procedure with MAC sedation.  
 
Ulises Lynn MD 
1/30/2019 
7:56 AM

## 2019-01-30 NOTE — PROCEDURES
Colonoscopy Procedure Note    Indications:   Screening colonoscopy  Referring Physician: Gladis Peters MD   Anesthesia/Sedation:MAC  Endoscopist:  Dr. Laureano Comer  Assistant:  Endoscopy Technician-1: Cathi Bucio  Endoscopy RN-1: Slim Ring RN    Preoperative diagnosis: SCREENING    Postoperative diagnosis: 1.- Sigmoid polyp - 2-3 cm pedunculated      Procedure in Detail:  Informed consent was obtained for the procedure, including sedation. Risks of perforation, hemorrhage, adverse drug reaction, and aspiration were discussed. The patient was placed in the left lateral decubitus position. Based on the pre-procedure assessment, including review of the patient's medical history, medications, allergies, and review of systems, he had been deemed to be an appropriate candidate for moderate sedation; he was therefore sedated with the medications listed above. The patient was monitored continuously with ECG tracing, pulse oximetry, blood pressure monitoring, and direct observations. A rectal examination was performed. The [id number] was inserted into the rectum and advanced under direct vision to the cecum, which was identified by the ileocecal valve and appendiceal orifice. The quality of the colonic preparation was excellent. A careful inspection was made as the colonoscope was withdrawn, including a retroflexed view of the rectum; findings and interventions are described below. Findings:   Rectum: normal  Sigmoid: at 20 cm there was a 2-3 cm pedunculated polyp on wide and long stalk removed with hot snare and clipped x 4  Descending Colon: normal  Transverse Colon: normal  Ascending Colon: normal  Cecum: normal  Terminal Ileum: normal    Specimens:     see above    EBL: None    Complications: None; patient tolerated the procedure well. Recommendations:     - Await pathology. - Repeat colonoscopy in 3 years.      - If < 10 years, reason: above average risk patient     - No aspirin today, resume aspirin tomorrow but only 81 mg x 7 days then resume 325 mg aspirin    Signed By: Anne Irby MD                        January 30, 2019

## 2019-01-30 NOTE — ANESTHESIA PREPROCEDURE EVALUATION
Anesthetic History No history of anesthetic complications Review of Systems / Medical History Patient summary reviewed, nursing notes reviewed and pertinent labs reviewed Pulmonary Within defined limits Sleep apnea Neuro/Psych Within defined limits Cardiovascular Within defined limits Hypertension CAD 
 
 
  
GI/Hepatic/Renal 
Within defined limits GERD Endo/Other Within defined limits Diabetes Obesity Other Findings Physical Exam 
 
Airway Mallampati: II 
TM Distance: > 6 cm Neck ROM: normal range of motion Mouth opening: Normal 
 
 Cardiovascular Regular rate and rhythm,  S1 and S2 normal,  no murmur, click, rub, or gallop Dental 
No notable dental hx Pulmonary Breath sounds clear to auscultation Abdominal 
GI exam deferred Other Findings Anesthetic Plan ASA: 3 Anesthesia type: MAC

## 2019-01-30 NOTE — PROGRESS NOTES

## 2019-01-30 NOTE — DISCHARGE INSTRUCTIONS
Tanya Delgado  [id number]  1970    COLON DISCHARGE INSTRUCTIONS  Discomfort:  Redness at IV site- apply warm compress to area; if redness or soreness persist- contact your physician  There may be a slight amount of blood passed from the rectum  Gaseous discomfort- walking, belching will help relieve any discomfort  You may not operate a vehicle for 12 hours  You may not engage in an occupation involving machinery or appliances for rest of today  You may not drink alcoholic beverages for at least 12 hours  Avoid making any critical decisions for at least 24 hour  DIET:   Regular diet. - however -  remember your colon is empty and a heavy meal will produce gas. Avoid these foods:  vegetables, fried / greasy foods, carbonated drinks for today. ACTIVITY:  You may resume your normal daily activities it is recommended that you spend the remainder of the day resting -  avoid any strenuous activity. CALL M.D. ANY SIGN OF:  Increasing pain, nausea, vomiting  Abdominal distension (swelling)  New increased bleeding (oral or rectal)  Fever (chills)  Pain in chest area  Bloody discharge from nose or mouth  Shortness of breath    You may not  take any Advil, Ibuprofen, Motrin, Aleve, Goodys, or any similar pain or arthritis products for 10 days, ONLY  Tylenol as needed for pain. See below for instructions regarding aspirin        Follow-up Instructions:   Call Dr. Betzy Handley  Results of procedure / biopsy in 10-14days   Office telephone for problems or questions [phone]      - Await pathology. - Repeat colonoscopy in 3 years. - If < 10 years, reason: above average risk patient     - No aspirin today, resume aspirin tomorrow but only 81 mg x 7 days then resume 325 mg aspirin    Patient Education        Colon Polyps: Care Instructions  Your Care Instructions    Colon polyps are growths in the colon or the rectum.  The cause of most colon polyps is not known, and most people who get them do not have any problems. But a certain kind can turn into cancer. For this reason, regular testing for colon polyps is important for people age 48 and older and anyone who has an increased risk for colon cancer. Polyps are usually found through routine colon cancer screening tests. Although most colon polyps are not cancerous, they are usually removed and then tested for cancer. Screening for colon cancer saves lives because the cancer can usually be cured if it is caught early. If you have a polyp that is the type that can turn into cancer, you may need more tests to examine your entire colon. The doctor will remove any other polyps that he or she finds, and you will be tested more often. Follow-up care is a key part of your treatment and safety. Be sure to make and go to all appointments, and call your doctor if you are having problems. It's also a good idea to know your test results and keep a list of the medicines you take. How can you care for yourself at home? Regular exams to look for colon polyps are the best way to prevent polyps from turning into colon cancer. These can include stool tests, sigmoidoscopy, colonoscopy, and CT colonography. Talk with your doctor about a testing schedule that is right for you. To prevent polyps  There is no home treatment that can prevent colon polyps. But these steps may help lower your risk for cancer. · Stay active. Being active can help you get to and stay at a healthy weight. Try to exercise on most days of the week. Walking is a good choice. · Eat well. Choose a variety of vegetables, fruits, legumes (such as peas and beans), fish, poultry, and whole grains. · Do not smoke. If you need help quitting, talk to your doctor about stop-smoking programs and medicines. These can increase your chances of quitting for good. · If you drink alcohol, limit how much you drink. Limit alcohol to 2 drinks a day for men and 1 drink a day for women. When should you call for help?   Call your doctor now or seek immediate medical care if:    · You have severe belly pain.     · Your stools are maroon or very bloody.    Watch closely for changes in your health, and be sure to contact your doctor if:    · You have a fever.     · You have nausea or vomiting.     · You have a change in bowel habits (new constipation or diarrhea).     · Your symptoms get worse or are not improving as expected. Where can you learn more? Go to http://esdras-arabella.info/. Enter [id number] in the search box to learn more about \"Colon Polyps: Care Instructions. \"  Current as of: March 27, 2018  Content Version: 11.9  © 6486-9672 [employer]. Care instructions adapted under license by [employer] (which disclaims liability or warranty for this information). If you have questions about a medical condition or this instruction, always ask your healthcare professional. [address] any warranty or liability for your use of this information.

## 2019-01-30 NOTE — ROUTINE PROCESS
Faye Friend 1970 
[id number] Situation: 
Verbal report received from: Indu Persaud RN Procedure: Procedure(s): 
COLONOSCOPY 
ENDOSCOPIC POLYPECTOMY RESOLUTION CLIP Background: 
 
Preoperative diagnosis: SCREENING Postoperative diagnosis: 1.- Sigmoid polyp :  Dr. Liban Avelar Assistant(s): Endoscopy Technician-1: Lilian Cooley Endoscopy RN-1: Stew Trejo RN Specimens:  
ID Type Source Tests Collected by Time Destination 1 : Sigmoid Colon Polyp Preservative   Griffin Lopez MD 1/30/2019 4304 Pathology H. Pylori  no Assessment: 
Intra-procedure medications Anesthesia gave intra-procedure sedation and medications, see anesthesia flow sheet yes Intravenous fluids: NS@ [facility] [place] Vital signs stable Abdominal assessment: round and soft Recommendation: 
Discharge patient per MD order Family or Friend Pt wife Permission to share finding with family or friend yes

## 2019-02-04 ENCOUNTER — CLINICAL SUPPORT (OUTPATIENT)
Dept: INTERNAL MEDICINE CLINIC | Age: 49
End: 2019-02-04

## 2019-02-04 DIAGNOSIS — E11.9 TYPE 2 DIABETES MELLITUS WITHOUT COMPLICATION, WITHOUT LONG-TERM CURRENT USE OF INSULIN (HCC): Primary | ICD-10-CM

## 2019-02-04 LAB — GLUCOSE POC: 181 MG/DL

## 2019-02-04 NOTE — PROGRESS NOTES
Chief Complaint   Patient presents with    Blood sugar problem     Patient in office for blood sugar check. Dagmarnet states that he takes Humalin 70/30 10 units tid. Results for orders placed or performed in visit on 02/04/19   AMB POC GLUCOSE BLOOD, BY GLUCOSE MONITORING DEVICE   Result Value Ref Range    Glucose  mg/dL     Per Dr. Canelo Morrow, he advises patient to begin taking Humalin 70/30 20 units bid (prescription e-scribed to pharmacy), and return to office in two weeks for blood sugar check. Patient verbalized understanding.

## 2019-02-14 ENCOUNTER — OFFICE VISIT (OUTPATIENT)
Dept: SLEEP MEDICINE | Age: 49
End: 2019-02-14

## 2019-02-14 VITALS
HEART RATE: 78 BPM | HEIGHT: 65 IN | BODY MASS INDEX: 34.49 KG/M2 | OXYGEN SATURATION: 98 % | SYSTOLIC BLOOD PRESSURE: 146 MMHG | WEIGHT: 207 LBS | DIASTOLIC BLOOD PRESSURE: 78 MMHG

## 2019-02-14 DIAGNOSIS — I10 ESSENTIAL HYPERTENSION: ICD-10-CM

## 2019-02-14 DIAGNOSIS — Z86.59 H/O: DEPRESSION: ICD-10-CM

## 2019-02-14 DIAGNOSIS — G47.33 OSA (OBSTRUCTIVE SLEEP APNEA): Primary | ICD-10-CM

## 2019-02-14 NOTE — PATIENT INSTRUCTIONS
[address] [place]. [place], [address]  Tel.  [phone]  Fax. 100 [facility] 60  [place], [address] [facility]  Tel.  [phone]  Fax. [phone] [address]  Tel.  [phone]  Fax. [phone]     Sleep Apnea: After Your Visit  Your Care Instructions  Sleep apnea occurs when you frequently stop breathing for 10 seconds or longer during sleep. It can be mild to severe, based on the number of times per hour that you stop breathing or have slowed breathing. Blocked or narrowed airways in your nose, mouth, or throat can cause sleep apnea. Your airway can become blocked when your throat muscles and tongue relax during sleep. Sleep apnea is common, occurring in 1 out of 20 individuals. Individuals having any of the following characteristics should be evaluated and treated right away due to high risk and detrimental consequences from untreated sleep apnea:  1. Obesity  2. Congestive Heart failure  3. Atrial Fibrillation  4. Uncontrolled Hypertension  5. Type II Diabetes  6. Night-time Arrhythmias  7. Stroke  8. Pulmonary Hypertension  9. High-risk Driving Populations (pilots, truck drivers, etc.)  10. Patients Considering Weight-loss Surgery    How do you know you have sleep apnea? You probably have sleep apnea if you answer 'yes' to 3 or more of the following questions:  S - Have you been told that you Snore? T - Are you often Tired during the day? O - Has anyone Observed you stop breathing while sleeping? P- Do you have (or are being treated for) high blood Pressure? B - Are you obese (Body Mass Index > 35)? A - Is your Age 48years old or older? N - Is your Neck size greater than 16 inches? G - Are you male Gender? A sleep physician can prescribe a breathing device that prevents tissues in the throat from blocking your airway.  Or your doctor may recommend using a dental device (oral breathing device) to help keep your airway open. In some cases, surgery may be needed to remove enlarged tissues in the throat. Follow-up care is a key part of your treatment and safety. Be sure to make and go to all appointments, and call your doctor if you are having problems. It's also a good idea to know your test results and keep a list of the medicines you take. How can you care for yourself at home? · Lose weight, if needed. It may reduce the number of times you stop breathing or have slowed breathing. · Go to bed at the same time every night. · Sleep on your side. It may stop mild apnea. If you tend to roll onto your back, sew a pocket in the back of your pajama top. Put a tennis ball into the pocket, and stitch the pocket shut. This will help keep you from sleeping on your back. · Avoid alcohol and medicines such as sleeping pills and sedatives before bed. · Do not smoke. Smoking can make sleep apnea worse. If you need help quitting, talk to your doctor about stop-smoking programs and medicines. These can increase your chances of quitting for good. · Prop up the head of your bed 4 to 6 inches by putting bricks under the legs of the bed. · Treat breathing problems, such as a stuffy nose, caused by a cold or allergies. · Use a continuous positive airway pressure (CPAP) breathing machine if lifestyle changes do not help your apnea and your doctor recommends it. The machine keeps your airway from closing when you sleep. · If CPAP does not help you, ask your doctor whether you should try other breathing machines. A bilevel positive airway pressure machine has two types of air pressureâone for breathing in and one for breathing out. Another device raises or lowers air pressure as needed while you breathe. · If your nose feels dry or bleeds when using one of these machines, talk with your doctor about increasing moisture in the air. A humidifier may help.   · If your nose is runny or stuffy from using a breathing machine, talk with your doctor about using decongestants or a corticosteroid nasal spray. When should you call for help? Watch closely for changes in your health, and be sure to contact your doctor if:  · You still have sleep apnea even though you have made lifestyle changes. · You are thinking of trying a device such as CPAP. · You are having problems using a CPAP or similar machine. Where can you learn more? Go to [employer]. Enter C711 in the search box to learn more about \"Sleep Apnea: After Your Visit. \"   © 7980-1424 Healthwise, Incorporated. Care instructions adapted under license by [facility] [employer] (which disclaims liability or warranty for this information). This care instruction is for use with your licensed healthcare professional. If you have questions about a medical condition or this instruction, always ask your healthcare professional. Gregor Ching any warranty or liability for your use of this information. PROPER SLEEP HYGIENE    What to avoid  · Do not have drinks with caffeine, such as coffee or black tea, for 8 hours before bed. · Do not smoke or use other types of tobacco near bedtime. Nicotine is a stimulant and can keep you awake. · Avoid drinking alcohol late in the evening, because it can cause you to wake in the middle of the night. · Do not eat a big meal close to bedtime. If you are hungry, eat a light snack. · Do not drink a lot of water close to bedtime, because the need to urinate may wake you up during the night. · Do not read or watch TV in bed. Use the bed only for sleeping and sexual activity. What to try  · Go to bed at the same time every night, and wake up at the same time every morning. Do not take naps during the day. · Keep your bedroom quiet, dark, and cool. · Get regular exercise, but not within 3 to 4 hours of your bedtime. .  · Sleep on a comfortable pillow and mattress.   · If watching the clock makes you anxious, turn it facing away from you so you cannot see the time. · If you worry when you lie down, start a worry book. Well before bedtime, write down your worries, and then set the book and your concerns aside. · Try meditation or other relaxation techniques before you go to bed. · If you cannot fall asleep, get up and go to another room until you feel sleepy. Do something relaxing. Repeat your bedtime routine before you go to bed again. · Make your house quiet and calm about an hour before bedtime. Turn down the lights, turn off the TV, log off the computer, and turn down the volume on music. This can help you relax after a busy day. Drowsy Driving  The [address] [place] [address] Road Traffic Safety Administration cites drowsiness as a causing factor in more than 233,814 police reported crashes annually, resulting in 76,000 injuries and 1,500 deaths. Other surveys suggest 55% of people polled have driven while drowsy in the past year, 23% had fallen asleep but not crashed, 3% crashed, and 2% had and accident due to drowsy driving. Who is at risk? Young Drivers: One study of drowsy driving accidents states that 55% of the drivers were under 25 years. Of those, 75% were male. Shift Workers and Travelers: People who work overnight or travel across time zones frequently are at higher risk of experiencing Circadian Rhythm Disorders. They are trying to work and function when their body is programed to sleep. Sleep Deprived: Lack of sleep has a serious impact on your ability to pay attention or focus on a task. Consistently getting less than the average of 8 hours your body needs creates partial or cumulative sleep deprivation. Untreated Sleep Disorders: Sleep Apnea, Narcolepsy, R.L.S., and other sleep disorders (untreated) prevent a person from getting enough restful sleep. This leads to excessive daytime sleepiness and increases the risk for drowsy driving accidents by up to 7 times.   Medications / Alcohol: Even over the counter medications can cause drowsiness. Medications that impair a drivers attention should have a warning label. Alcohol naturally makes you sleepy and on its own can cause accidents. Combined with excessive drowsiness its effects are amplified. Signs of Drowsy Driving:   * You don't remember driving the last few miles   * You may drift out of your juliocesar   * You are unable to focus and your thoughts wander   * You may yawn more often than normal   * You have difficulty keeping your eyes open / nodding off   * Missing traffic signs, speeding, or tailgating  Prevention-   Good sleep hygiene, lifestyle and behavioral choices have the most impact on drowsy driving. There is no substitute for sleep and the average person requires 8 hours nightly. If you find yourself driving drowsy, stop and sleep. Consider the sleep hygiene tips provided during your visit as well. Medication Refill Policy: Refills for all medications require 1 week advance notice. Please have your pharmacy fax a refill request. We are unable to fax, or call in \"controled substance\" medications and you will need to pick these prescriptions up from our office. Orate Activation    Thank you for requesting access to Orate. Please follow the instructions below to securely access and download your online medical record. Orate allows you to send messages to your doctor, view your test results, renew your prescriptions, schedule appointments, and more. How Do I Sign Up? 1. In your internet browser, go to [email or web address]. 2. Click on the First Time User? Click Here link in the Sign In box. You will see the New Member Sign Up page. 3. Enter your Orate Access Code exactly as it appears below. You will not need to use this code after youve completed the sign-up process. If you do not sign up before the expiration date, you must request a new code. Orate Access Code:  [email or web address]  Expires: 3/9/2019  9:42 AM (This is the date your [employer] access code will )    4. Enter the last four digits of your Social Security Number (xxxx) and Date of Birth (mm/dd/yyyy) as indicated and click Submit. You will be taken to the next sign-up page. 5. Create a [employer] ID. This will be your [employer] login ID and cannot be changed, so think of one that is secure and easy to remember. 6. Create a [employer] password. You can change your password at any time. 7. Enter your Password Reset Question and Answer. This can be used at a later time if you forget your password. 8. Enter your e-mail address. You will receive e-mail notification when new information is available in [address] 9. Click Sign Up. You can now view and download portions of your medical record. 10. Click the Download Summary menu link to download a portable copy of your medical information. Additional Information    If you have questions, please call [phone]. Remember, [employer] is NOT to be used for urgent needs. For medical emergencies, dial 911.

## 2019-02-14 NOTE — PROGRESS NOTES
[address] [place]. [place], [address]  Tel.  [phone]  Fax. 100 [facility] 60  [place], [address] [facility]  Tel.  [phone]  Fax. [phone] [address] [facility] [facility]   Tel.  [phone]  Fax. [phone]         Subjective:      Larissa Castano is an 44 y.o. male referred for evaluation for a sleep disorder. He complains of snoring associated with snorting, periods of not breathing, excessive daytime sleepiness. Symptoms began several years ago, gradually worsening since that time. He usually can fall asleep in 5 minutes. Family or house members note snoring. He denies completely or partially paralyzed while falling asleep or waking up. Larissa Castano does wake up frequently at night. He is bothered by waking up too early and left unable to get back to sleep. He actually sleeps about 3 hours at night and wakes up about 6 times during the night. He does not work shifts: Destiny Veras indicates he does get too little sleep at night. His bedtime is 2100. He awakens at 0330. He does take naps. He takes 2-3 naps a week lasting 3, 15 to 30, Minute(s). He has the following observed behaviors: Loud snoring, Light snoring, Sleep talking, Pauses in breathing, Grinding teeth, Kicking with legs, Twitching of legs or feet;  . Other remarks: Waking with a gasp or snort and vivid dreams    [place] Sleepiness Score: 7     Allergies   Allergen Reactions    Pcn [Penicillins] Itching         Current Outpatient Medications:     Blood-Glucose Meter (ONETOUCH ULTRA2) monitoring kit, USE TO TEST BLOOD SUGAR THREE TIMES A DAY. DX.E11.9, Disp: 1 Kit, Rfl: 0    glucose blood VI test strips (ONETOUCH ULTRA BLUE TEST STRIP) strip, USE TO TEST BLOOD SUGAR THREE TIMES A DAY. DX.E11.9, Disp: 100 Strip, Rfl: 11    lancets (ONE TOUCH DELICA) 33 gauge misc, USE TO TEST BLOOD SUGAR THREE TIMES A DAY.  DX.E11.9, Disp: 100 Lancet, Rfl: 11    amLODIPine (NORVASC) 5 mg tablet, Take 1 Tab by mouth daily. , Disp: 30 Tab, Rfl: 11    sildenafil citrate (VIAGRA) 100 mg tablet, Take 1 Tab by mouth as needed. , Disp: 10 Tab, Rfl: 11    metoprolol tartrate (LOPRESSOR) 50 mg tablet, Take 1 Tab by mouth two (2) times a day., Disp: 60 Tab, Rfl: 11    atorvastatin (LIPITOR) 80 mg tablet, Take 1 Tab by mouth daily. , Disp: 30 Tab, Rfl: 11    insulin nph-regular human rec (HUMULIN 70-30) 100 unit/mL (70-30) inpn, 20 Units by SubCUTAneous route Before breakfast and dinner., Disp: 5 Adjustable Dose Pre-filled Pen Syringe, Rfl: 11    multivit-min/folic/vit K/lycop (MEN'S MULTIVITAMIN PO), Take 1 Tab by mouth daily. , Disp: , Rfl:     aspirin (ASPIRIN) 325 mg tablet, Take 325 mg by mouth daily. , Disp: , Rfl:     cholecalciferol (VITAMIN D3) 1,000 unit cap, Take 1,000 Units by mouth daily. , Disp: , Rfl:     hydrALAZINE (APRESOLINE) 25 mg tablet, Take 1 Tab by mouth two (2) times a day., Disp: 60 Tab, Rfl: 11    traZODone (DESYREL) 50 mg tablet, Take 1 Tab by mouth nightly., Disp: 30 Tab, Rfl: 6     He  has a past medical history of Bipolar 1 disorder ([place]), CAD (coronary artery disease) (37yo), Depression, Diabetes ([facility]), GERD (gastroesophageal reflux disease), CABG (2007), Hypercholesterolemia, Hypertension, Obesity, SHIRLEY (obstructive sleep apnea), and PVD (peripheral vascular disease) ([facility]). He also has no past medical history of Adverse effect of anesthesia. He  has a past surgical history that includes hx appendectomy; hx heart catheterization; pr cabg, artery-vein, three; hx hernia repair; and colonoscopy (N/A, 1/30/2019). He family history includes Diabetes in his mother; Heart Disease in his mother; Hypertension in his father and mother; Stroke in his father. He  reports that  has never smoked. he has never used smokeless tobacco. He reports that he does not drink alcohol or use drugs.      Review of Systems:  Constitutional:  No significant weight loss or weight gain  Eyes:  No blurred vision  CVS:  No significant chest pain  Pulm:  No significant shortness of breath  GI:  No significant nausea or vomiting  :  No significant nocturia  Musculoskeletal:  No significant joint pain at night  Skin:  No significant rashes  Neuro:  No significant dizziness   Psych:  No active mood issues    Sleep Review of Systems: notable for no difficulty falling asleep; infrequent awakenings at night;  regular dreaming noted; no nightmares ; no early morning headaches; no memory problems; no concentration issues; no history of any automobile or occupational accidents due to daytime drowsiness. Objective:     Visit Vitals  /78   Pulse 78   Ht 5' 5\" (1.651 m)   Wt 207 lb (93.9 kg)   SpO2 98%   BMI 34.45 kg/m²         General:   Not in acute distress   Eyes:  Anicteric sclerae, no obvious strabismus   Nose:  No obvious nasal septum deviation    Oropharynx:   Class 4 oropharyngeal outlet, thick tongue base, uvula could not be seen due to low-lying soft palate, narrow tonsilo-pharyngeal pilars   Tonsils:   tonsils are not seen due to low-lying soft palate   Neck:   Neck circ. in \"inches\": 16.5; midline trachea   Chest/Lungs:  Equal lung expansion, clear on auscultation    CVS:  Normal rate, regular rhythm; no JVD   Skin:  Warm to touch; no obvious rashes   Neuro:  No focal deficits ; no obvious tremor    Psych:  Normal affect,  normal countenance;          Assessment:       ICD-10-CM ICD-9-CM    1. SHIRLEY (obstructive sleep apnea) G47.33 327.23 POLYSOMNOGRAPHY 1 NIGHT   2. Essential hypertension I10 401.9    3. BMI 34.0-34.9,adult Z68.34 V85.34    4. H/O: depression Z86.59 V11.8          Plan:     * The patient currently has a High Risk for having sleep apnea. STOP-BANG score 6.  * Sleep testing was ordered for initial evaluation. * He was provided information on sleep apnea including coresponding risk factors and the importance of proper treatment.    * Treatment options if indicated were reviewed today. Patient agrees to a trial of PAP therapy if indicated. * Counseling was provided regarding proper sleep hygiene (including effect of light on sleep), paradoxical intention, stimulus control, sleep environment safety and safe driving. * Effect of sleep disturbance on weight was reviewed. We have recommended a dedicated weight loss through appropriate diet and an exercise regiment as significant weight reduction has been shown to reduce severity of obstructive sleep apnea. * Patient agrees to telephone [phone]  follow-up by myself or lead sleep technologist shortly after sleep study to review results and plan final management.     (patient has given permission for a message to be left regarding test results and further management if patient cannot be cannot be reached directly). Thank you for allowing us to participate in your patient's medical care. We'll keep you updated on these investigations. Stefani Bermudez MD, [facility]  Electronically signed.  02/14/19

## 2019-03-13 RX ORDER — PEN NEEDLE, DIABETIC 30 GX3/16"
NEEDLE, DISPOSABLE MISCELLANEOUS
Qty: 100 PACKAGE | Refills: 11 | Status: SHIPPED | OUTPATIENT
Start: 2019-03-13

## 2019-04-26 ENCOUNTER — HOSPITAL ENCOUNTER (OUTPATIENT)
Dept: SLEEP MEDICINE | Age: 49
Discharge: HOME | End: 2019-04-26
Payer: MEDICARE

## 2019-04-26 VITALS
HEIGHT: 65 IN | BODY MASS INDEX: 35.32 KG/M2 | DIASTOLIC BLOOD PRESSURE: 96 MMHG | HEART RATE: 82 BPM | OXYGEN SATURATION: 98 % | SYSTOLIC BLOOD PRESSURE: 190 MMHG | WEIGHT: 212 LBS

## 2019-04-26 DIAGNOSIS — G47.33 OSA (OBSTRUCTIVE SLEEP APNEA): ICD-10-CM

## 2019-04-26 PROCEDURE — 95810 POLYSOM 6/> YRS 4/> PARAM: CPT | Performed by: INTERNAL MEDICINE

## 2019-05-02 ENCOUNTER — TELEPHONE (OUTPATIENT)
Dept: SLEEP MEDICINE | Age: 49
End: 2019-05-02

## 2019-05-02 DIAGNOSIS — G47.39 MIXED SLEEP APNEA: Primary | ICD-10-CM

## 2019-05-02 NOTE — TELEPHONE ENCOUNTER
Ngozi Cassidy is to be contacted by lead sleep technologist regarding results of Sleep Testing which was indicative of an average AHI of 15.6 per hour with an SpO2 doyle of 86% . * A second polysomnogram has been ordered for mask fitting, PAP desensitizing protocol, and pressure titration. * Follow-up appointment will be scheduled 8-12 weeks following PAP initiation to gauge treatment response and compliance. Encounter Diagnosis   Name Primary?  Mixed sleep apnea Yes       Orders Placed This Encounter    SLEEP LAB (PAP TITRATION)     Standing Status:   Future     Standing Expiration Date:   11/2/2019     Scheduling Instructions:      Perform ASV Titration:      Rate: Auto; Max pressure: 25 cmH2O. Maximum EPAP: 10 cmH2O; Minimum EPAP: 04 cmH2O. Maximum PS: 15 cmH2O; Minimum PS: 02 cmH2O.      Order Specific Question:   Reason for Exam     Answer:   Mixed Sleep Apnea

## 2019-05-06 NOTE — TELEPHONE ENCOUNTER
Reviewed sleep study results with patient. He expressed understanding and is willing to proceed with a AVAPs Titration. Please schedule titration.     Thanks

## 2019-05-16 ENCOUNTER — HOSPITAL ENCOUNTER (OUTPATIENT)
Dept: SLEEP MEDICINE | Age: 49
Discharge: HOME | End: 2019-05-16
Payer: MEDICARE

## 2019-05-16 VITALS
BODY MASS INDEX: 35.04 KG/M2 | HEART RATE: 88 BPM | DIASTOLIC BLOOD PRESSURE: 90 MMHG | HEIGHT: 65 IN | SYSTOLIC BLOOD PRESSURE: 163 MMHG | WEIGHT: 210.3 LBS | OXYGEN SATURATION: 97 %

## 2019-05-16 DIAGNOSIS — G47.39 MIXED SLEEP APNEA: ICD-10-CM

## 2019-05-16 PROCEDURE — 95811 POLYSOM 6/>YRS CPAP 4/> PARM: CPT | Performed by: INTERNAL MEDICINE

## 2019-05-21 ENCOUNTER — TELEPHONE (OUTPATIENT)
Dept: SLEEP MEDICINE | Age: 49
End: 2019-05-21

## 2019-05-21 DIAGNOSIS — G47.39 MIXED SLEEP APNEA: Primary | ICD-10-CM

## 2019-05-22 ENCOUNTER — DOCUMENTATION ONLY (OUTPATIENT)
Dept: SLEEP MEDICINE | Age: 49
End: 2019-05-22

## 2019-06-12 ENCOUNTER — TELEPHONE (OUTPATIENT)
Dept: SLEEP MEDICINE | Age: 49
End: 2019-06-12

## 2019-06-12 NOTE — TELEPHONE ENCOUNTER
Patient not qualifying for Bilevel device. Needs evidence that CPAP was tried and failed. Please advise.

## 2019-09-17 ENCOUNTER — TELEPHONE (OUTPATIENT)
Dept: CARDIOLOGY CLINIC | Age: 49
End: 2019-09-17

## 2019-09-17 ENCOUNTER — OFFICE VISIT (OUTPATIENT)
Dept: CARDIOLOGY CLINIC | Age: 49
End: 2019-09-17

## 2019-09-17 VITALS
OXYGEN SATURATION: 98 % | WEIGHT: 202 LBS | DIASTOLIC BLOOD PRESSURE: 94 MMHG | BODY MASS INDEX: 33.66 KG/M2 | RESPIRATION RATE: 16 BRPM | SYSTOLIC BLOOD PRESSURE: 167 MMHG | HEART RATE: 86 BPM | HEIGHT: 65 IN

## 2019-09-17 DIAGNOSIS — G47.33 OSA (OBSTRUCTIVE SLEEP APNEA): ICD-10-CM

## 2019-09-17 DIAGNOSIS — F31.9 BIPOLAR 1 DISORDER (HCC): ICD-10-CM

## 2019-09-17 DIAGNOSIS — I73.9 PVD (PERIPHERAL VASCULAR DISEASE) (HCC): ICD-10-CM

## 2019-09-17 DIAGNOSIS — R07.9 CHEST PAIN, EXERTIONAL: Primary | ICD-10-CM

## 2019-09-17 DIAGNOSIS — Z95.1 HX OF CABG: ICD-10-CM

## 2019-09-17 DIAGNOSIS — I10 ESSENTIAL HYPERTENSION: ICD-10-CM

## 2019-09-17 DIAGNOSIS — E78.00 HYPERCHOLESTEROLEMIA: ICD-10-CM

## 2019-09-17 RX ORDER — METOPROLOL TARTRATE 100 MG/1
100 TABLET ORAL 2 TIMES DAILY
Qty: 60 TAB | Refills: 6 | Status: SHIPPED | OUTPATIENT
Start: 2019-09-17

## 2019-09-17 RX ORDER — NITROGLYCERIN 0.3 MG/1
0.3 TABLET SUBLINGUAL
Qty: 25 TAB | Refills: 3 | Status: SHIPPED | OUTPATIENT
Start: 2019-09-17 | End: 2019-12-23

## 2019-09-17 RX ORDER — AMLODIPINE BESYLATE 10 MG/1
10 TABLET ORAL DAILY
Qty: 30 TAB | Refills: 6 | Status: SHIPPED | OUTPATIENT
Start: 2019-09-17

## 2019-09-17 NOTE — TELEPHONE ENCOUNTER
I called the [facility] to verify they received scripts for Metoprolol and Nitroglycerine Sublingual. I was informed by the tech they were not received. Verbal given for Metoprolol 100 mg 1 tablet by mouth twice day and Nitrostat 0.3 mg sublingual, 1 sublingual every 5 minutes as needed for chest pain ( every 5 minutes X 2). If no relief call 911.

## 2019-09-17 NOTE — PROGRESS NOTES
[place] [facility]   [address] [facility], [address]                                          NEW PATIENT HPI/FOLLOW-UP      NAME:  Zaid Locke   :   1970   MRN:   [id number]   PCP:  Marcelina Liao MD           Subjective: The patient is a 44y.o. year old male, non-smoker who along with his wife moved from [address] [place] [address] last year to help take care of [de-identified] yo debilitated,invalid  of charity: water who needed help. PMhx includes HTN, DM, dyslipidemia, obesity, medication non-compliance, GERD, depression, bipolar disorder, SHIRLEY on CPAP but device ineffective. Is being fitted again by Sleep Clinic. Patient on disability with PMHx of s/p PCI/stenting  [place], followed by incomplete CABG X2 ( apparently severe diffuse CAD) followed by repeat PCI/stenting . Has not followed up with Cardiology since moving to [place] and is essentially referred to establish Cardiology care and to assess recurrent chest pain. Reports running out of some of meds since 6 months ago or so. Since then has had recurring exertionally-induced mild-moderate precordial chest pressure,left greater than right without radiation. No associated symptoms except for SOB. Resolves in 10-15 minutes with rest. Has not used sl NTG tabs which he assumes \"is too old\". who returns for a routine follow-up. Denies edema, medication intolerance, palpitations, PND/orthopnea wheezing, sputum, syncope, dizziness or light headedness. Review of Systems  General: Pt denies excessive weight gain or loss. Pt is able to conduct ADL's. Respiratory: ++MACHADO, -wheezing or stridor.   Cardiovascular: ++precordial pain, - palpitations, edema or PND  Gastrointestinal: Denies poor appetite, +indigestion, abdominal pain or blood in stool  Peripheral vascular: Denies claudication, leg cramps  Neuropsychiatric: Denies paresthesias,tingling,numbness,+anxiety,depression,fatigue  Musculoskeletal: Denies pain,tenderness, soreness,swelling      Past Medical History:   Diagnosis Date    B[facility]     CAD (coronary artery disease) 42yo    CABG    Depression     Diabetes (HCC)     GERD (gastroesophageal reflux disease)     [facility]G 2007    [facility]    Hypercholesterolemia     Hypertension     Obesity     SHIRLEY (obstructive sleep apnea)     PVD (peripheral vascular disease) ([facility]      Patient Active Problem List    Diagnosis Date Noted    SHIRLEY (obstructive sleep apnea) 01/09/2019    Diabetes ([facility]     Hypercholesterolemia     Hypertension     Bipolar 1 disorder ([facility]     Depression     PVD (peripheral vascular disease) ([facility]     Hx of CABG 01/01/2007      Past Surgical History:   Procedure Laterality Date    CABG, ARTERY-VEIN, THREE      COLONOSCOPY N/A 1/30/2019    COLONOSCOPY performed by Syed Cabrera MD at [facility]    HX APPENDECTOMY      HX HEART CATHETERIZATION      HX [address]      done with appendectomy     Allergies   Allergen Reactions    Iodine Angioedema    Pcn [Penicillins] Itching    Shellfish Derived Angioedema      Family History   Problem Relation Age of Onset    Hypertension Mother     Diabetes Mother     Heart Disease Mother     Hypertension Father     Stroke Father       Social History     Socioeconomic History    Marital status:      Spouse name: Not on file    Number of children: Not on file    Years of education: Not on file    Highest education level: Not on file   Occupational History    Not on file   Social Needs    Financial resource strain: Not on file    Food insecurity:     Worry: Not on file     Inability: Not on file    Transportation needs:     Medical: Not on file     Non-medical: Not on file   Tobacco Use    Smoking status: Never Smoker    Smokeless tobacco: Never Used   Substance and Sexual Activity    Alcohol use: No     Frequency: Never    Drug use: No    Sexual activity: Yes Partners: Female     Birth control/protection: None   Lifestyle    Physical activity:     Days per week: Not on file     Minutes per session: Not on file    Stress: Not on file   Relationships    Social connections:     Talks on phone: Not on file     Gets together: Not on file     Attends Bahai service: Not on file     Active member of club or organization: Not on file     Attends meetings of clubs or organizations: Not on file     Relationship status: Not on file    Intimate partner violence:     Fear of current or ex partner: Not on file     Emotionally abused: Not on file     Physically abused: Not on file     Forced sexual activity: Not on file   Other Topics Concern    Not on file   Social History Narrative    Habits:  Lifetime nonsmoker, non drinker. Used crack cocaine 30 or more years ago.         Social History:  The patient is , lives with his wife. He has a 32year old daughter and one grandson. He completed high school. He is a retired . Currently he is gainfully employed as a  at [address].         Family History:  Father  52 of stroke. Mother  64 with diabetes and heart condition. 52year old brother  of a ruptured brain aneurysm. 50year old brother is alive and well. Current Outpatient Medications   Medication Sig    Insulin Needles, Disposable, 31 gauge x 5/16\" ndle Use to inject insulin twice a day. Dx.e11.9    insulin nph-regular human rec (HUMULIN 70-30) 100 unit/mL (70-30) inpn 20 Units by SubCUTAneous route Before breakfast and dinner.  multivit-min/folic/vit K/lycop (MEN'S MULTIVITAMIN PO) Take 1 Tab by mouth daily.  aspirin (ASPIRIN) 325 mg tablet Take 325 mg by mouth daily.  cholecalciferol (VITAMIN D3) 1,000 unit cap Take 1,000 Units by mouth daily.  hydrALAZINE (APRESOLINE) 25 mg tablet Take 1 Tab by mouth two (2) times a day.     Blood-Glucose Meter (ONETOUCH ULTRA2) monitoring kit USE TO TEST BLOOD SUGAR THREE TIMES A DAY. DX.E11.9    glucose blood VI test strips (ONETOUCH ULTRA BLUE TEST STRIP) strip USE TO TEST BLOOD SUGAR THREE TIMES A DAY. DX.E11.9    lancets (ONE TOUCH DELICA) 33 gauge misc USE TO TEST BLOOD SUGAR THREE TIMES A DAY. DX.E11.9    traZODone (DESYREL) 50 mg tablet Take 1 Tab by mouth nightly.  amLODIPine (NORVASC) 5 mg tablet Take 1 Tab by mouth daily.  sildenafil citrate (VIAGRA) 100 mg tablet Take 1 Tab by mouth as needed.  metoprolol tartrate (LOPRESSOR) 50 mg tablet Take 1 Tab by mouth two (2) times a day.  atorvastatin (LIPITOR) 80 mg tablet Take 1 Tab by mouth daily. No current facility-administered medications for this visit. I have reviewed the nurses notes, vitals, problem list, allergy list, medical history, family medical, social history and medications. Objective:     Physical Exam:     Vitals:    09/17/19 0932   BP: (!) 167/94   Pulse: 86   Resp: 16   SpO2: 98%   Weight: 202 lb (91.6 kg)   Height: 5' 5\" (1.651 m)    Body mass index is 33.61 kg/m². General: Well developed,obese in no acute distress. HEENT: No carotid bruits, no JVD, trach is midline. Heart:  Normal S1/S2 negative S3 or S4. Regular, no murmur, gallop or rub.   Respiratory: Clear bilaterally, no wheezing or rales  Abdomen:   Soft, non-tender, bowel sounds are active.   Extremities:  No edema, normal cap refill, no cyanosis. Neuro: A&Ox3, speech clear, gait stable. Skin: Skin color is normal. No rashes or lesions. No diaphoresis. Vascular: 2+ pulses symmetric in all extremities        Data Review:       Cardiographics:    EKG: NSR,inferolateral Twave inversion--consider strain due to LVH vs ischemia    Cardiology Labs:    No results found for this or any previous visit.     Lab Results   Component Value Date/Time    Cholesterol, total 173 11/29/2018 11:42 AM    HDL Cholesterol 44 11/29/2018 11:42 AM    LDL, calculated 104 (H) 11/29/2018 11:42 AM    Triglyceride 123 11/29/2018 11:42 AM       Lab Results   Component Value Date/Time    Sodium 138 11/29/2018 11:42 AM    Potassium 4.0 11/29/2018 11:42 AM    Chloride 97 11/29/2018 11:42 AM    CO2 27 11/29/2018 11:42 AM    Glucose 254 (H) 11/29/2018 11:42 AM    BUN 13 11/29/2018 11:42 AM    Creatinine 1.04 11/29/2018 11:42 AM    BUN/Creatinine ratio 13 11/29/2018 11:42 AM    GFR est AA 96 11/29/2018 11:42 AM    GFR est non-AA 83 11/29/2018 11:42 AM    Calcium 9.1 11/29/2018 11:42 AM    Bilirubin, total 0.4 11/29/2018 11:42 AM    AST (SGOT) 17 11/29/2018 11:42 AM    Alk. phosphatase 81 11/29/2018 11:42 AM    Protein, total 6.6 11/29/2018 11:42 AM    Albumin 4.1 11/29/2018 11:42 AM    A-G Ratio 1.6 11/29/2018 11:42 AM    ALT (SGPT) 23 11/29/2018 11:42 AM          Assessment:       ICD-10-CM ICD-9-CM    1. Chest pain, exertional R07.9 786.50 NUCLEAR CARDIAC STRESS TEST      ECHO ADULT COMPLETE   2. Essential hypertension I10 401.9 AMB POC EKG ROUTINE W/ 12 LEADS, INTER & REP      NUCLEAR CARDIAC STRESS TEST      ECHO ADULT COMPLETE   3. Bipolar 1 disorder ([facility]) F31.9 296.7    4. Hypercholesterolemia E78.00 272.0 NUCLEAR CARDIAC STRESS TEST   5. PVD (peripheral vascular disease) ([facility]) I73.9 443.9 NUCLEAR CARDIAC STRESS TEST   6. Hx of CABG Z95.1 V45.81 NUCLEAR CARDIAC STRESS TEST      ECHO ADULT COMPLETE   7. SHIRLEY (obstructive sleep apnea) G47.33 327.23 NUCLEAR CARDIAC STRESS TEST      ECHO ADULT COMPLETE         Discussion: Patient presents at this time with probable recurrent [place] s/p remote PCI/stenting and CABG about 10 yrs ago associated with CAD risk factors obesity, IDDM,dyslipidemia, HTN--not at goal.  Will resume SL NTG tabs and increase Amlodipine to 10 mg every day, Metoprolol tartrate from once/day to BID. Consider adding Imdur. Call BP response in 3-5 days. Will obtain Lexiscan NST and Echo to assess coronary perfusion and cardiac structural integrity respectively. Pleased with present cardiac status.  If worse then CALL 911/EMS. Advised to f/u with Sleep Clinic ASAP. Plan: 1. Continue same meds. Lipid profile and labs followed by PCP. 2.Encouraged to exercise to tolerance, lose weight and follow low fat, low carb, low cholesterol, low sodium predominantly Plant-based (consider Mediterranean) diet. Call with questions or concerns. Will follow up any test results by phone and/or f/u here in office if needed. Light Challenger 3.Follow up: 2 weeks    I have discussed the diagnosis with the patient and the intended plan as seen in the above orders. The patient has received an after-visit summary and questions were answered concerning future plans. I have discussed any concerning medication side effects and warnings with the patient as well.     Evert Negron MD , [facility]  9/17/2019

## 2019-09-17 NOTE — PROGRESS NOTES
Chief Complaint   Patient presents with    New Patient     Need new Cardiologist.    Chest Pain     Intermitent chest heavyness.  Hypertension    Coronary Artery Disease     1. Have you been to the ER, urgent care clinic since your last visit? Hospitalized since your last visit? No    2. Have you seen or consulted any other health care providers outside of the [address] [place] [address] since your last visit? Include any pap smears or colon screening.  No

## 2019-10-08 ENCOUNTER — HOSPITAL ENCOUNTER (OUTPATIENT)
Dept: NON INVASIVE DIAGNOSTICS | Age: 49
Discharge: HOME | End: 2019-10-08
Attending: INTERNAL MEDICINE
Payer: MEDICARE

## 2019-10-08 ENCOUNTER — HOSPITAL ENCOUNTER (OUTPATIENT)
Dept: NUCLEAR MEDICINE | Age: 49
Discharge: HOME | End: 2019-10-08
Attending: INTERNAL MEDICINE
Payer: MEDICARE

## 2019-10-08 VITALS
SYSTOLIC BLOOD PRESSURE: 214 MMHG | BODY MASS INDEX: 33.66 KG/M2 | HEIGHT: 65 IN | HEART RATE: 78 BPM | DIASTOLIC BLOOD PRESSURE: 96 MMHG | WEIGHT: 202 LBS

## 2019-10-08 DIAGNOSIS — R07.9 CHEST PAIN, EXERTIONAL: ICD-10-CM

## 2019-10-08 DIAGNOSIS — Z95.1 HX OF CABG: ICD-10-CM

## 2019-10-08 DIAGNOSIS — E78.00 HYPERCHOLESTEROLEMIA: ICD-10-CM

## 2019-10-08 DIAGNOSIS — I10 ESSENTIAL HYPERTENSION: ICD-10-CM

## 2019-10-08 DIAGNOSIS — I73.9 PVD (PERIPHERAL VASCULAR DISEASE) (HCC): ICD-10-CM

## 2019-10-08 DIAGNOSIS — G47.33 OSA (OBSTRUCTIVE SLEEP APNEA): ICD-10-CM

## 2019-10-08 LAB
ECHO AO ROOT DIAM: 3.39 CM
ECHO AV AREA PLAN: 3.1 CM2
ECHO LA AREA 4C: 14.3 CM2
ECHO LA MAJOR AXIS: 4.07 CM
ECHO LA TO AORTIC ROOT RATIO: 1.2
ECHO LA VOL 4C: 37.54 ML (ref 18–58)
ECHO LV EDV A4C: 109.2 ML
ECHO LV EJECTION FRACTION A4C: 84 %
ECHO LV ESV A4C: 17.8 ML
ECHO LV INTERNAL DIMENSION DIASTOLIC: 4.15 CM (ref 4.2–5.9)
ECHO LV INTERNAL DIMENSION SYSTOLIC: 2.5 CM
ECHO LV IVSD: 1.57 CM (ref 0.6–1)
ECHO LV MASS 2D: 278.4 G (ref 88–224)
ECHO LV POSTERIOR WALL DIASTOLIC: 1.35 CM (ref 0.6–1)
ECHO LVOT DIAM: 2.16 CM
ECHO LVOT PEAK GRADIENT: 4.8 MMHG
ECHO LVOT PEAK VELOCITY: 109.02 CM/S
ECHO MV A VELOCITY: 44.5 CM/S
ECHO MV AREA PHT: 7.1 CM2
ECHO MV AREA PLAN: 6.6 CM2
ECHO MV E DECELERATION TIME (DT): 137.7 MS
ECHO MV E VELOCITY: 26.65 CM/S
ECHO MV E/A RATIO: 0.6
ECHO MV MAX VELOCITY: 71.77 CM/S
ECHO MV MEAN GRADIENT: 0.7 MMHG
ECHO MV PEAK GRADIENT: 2.1 MMHG
ECHO MV PRESSURE HALF TIME (PHT): 31.1 MS
ECHO MV VTI: 12.78 CM
ECHO PV MAX VELOCITY: 85.08 CM/S
ECHO PV PEAK GRADIENT: 2.9 MMHG
ECHO RA AREA 4C: 8.72 CM2

## 2019-10-08 PROCEDURE — 93306 TTE W/DOPPLER COMPLETE: CPT

## 2019-10-08 PROCEDURE — 74011250637 HC RX REV CODE- 250/637

## 2019-10-08 PROCEDURE — 93017 CV STRESS TEST TRACING ONLY: CPT

## 2019-10-08 PROCEDURE — 74011250637 HC RX REV CODE- 250/637: Performed by: INTERNAL MEDICINE

## 2019-10-08 RX ORDER — HYDRALAZINE HYDROCHLORIDE 25 MG/1
TABLET, FILM COATED ORAL
Status: COMPLETED
Start: 2019-10-08 | End: 2019-10-08

## 2019-10-08 RX ORDER — AMLODIPINE BESYLATE 5 MG/1
10 TABLET ORAL
Status: COMPLETED | OUTPATIENT
Start: 2019-10-08 | End: 2019-10-08

## 2019-10-08 RX ORDER — HYDRALAZINE HYDROCHLORIDE 25 MG/1
25 TABLET, FILM COATED ORAL
Status: ACTIVE | OUTPATIENT
Start: 2019-10-08 | End: 2019-10-08

## 2019-10-08 RX ORDER — AMLODIPINE BESYLATE 5 MG/1
TABLET ORAL
Status: DISPENSED
Start: 2019-10-08 | End: 2019-10-08

## 2019-10-08 RX ADMIN — HYDRALAZINE HYDROCHLORIDE 25 MG: 25 TABLET, FILM COATED ORAL at 10:54

## 2019-10-08 RX ADMIN — HYDRALAZINE HYDROCHLORIDE 25 MG: 25 TABLET, FILM COATED ORAL at 10:17

## 2019-10-08 RX ADMIN — AMLODIPINE BESYLATE 10 MG: 5 TABLET ORAL at 10:17

## 2019-10-08 NOTE — PROGRESS NOTES
Patient verbalized not wanting to participate in a Lexiscan test. Called and discussed with  Dr. Aidan Casey patient's request.  He stated that he is ok with the patient walking for the second portion of the nuclear stress test.  Will proceed as ordered.

## 2019-10-10 LAB
STRESS ANGINA INDEX: 2
STRESS BASELINE HR: 77 BPM
STRESS ESTIMATED WORKLOAD: 5.4 METS
STRESS EXERCISE DUR MIN: NORMAL
STRESS PEAK DIAS BP: 102 MMHG
STRESS PEAK SYS BP: 252 MMHG
STRESS PERCENT HR ACHIEVED: 89 %
STRESS POST PEAK HR: 150 BPM
STRESS RATE PRESSURE PRODUCT: NORMAL BPM*MMHG
STRESS TARGET HR: 169 BPM

## 2019-10-11 ENCOUNTER — TELEPHONE (OUTPATIENT)
Dept: CARDIOLOGY CLINIC | Age: 49
End: 2019-10-11

## 2019-10-11 DIAGNOSIS — Z95.1 HX OF CABG: ICD-10-CM

## 2019-10-11 DIAGNOSIS — R07.9 CHEST PAIN, EXERTIONAL: ICD-10-CM

## 2019-10-11 DIAGNOSIS — E11.65 TYPE 2 DIABETES MELLITUS WITH HYPERGLYCEMIA, UNSPECIFIED WHETHER LONG TERM INSULIN USE (HCC): ICD-10-CM

## 2019-10-11 DIAGNOSIS — Z01.818 PREOPERATIVE TESTING: ICD-10-CM

## 2019-10-11 DIAGNOSIS — R93.1 ABNORMAL NUCLEAR CARDIAC IMAGING TEST: Primary | ICD-10-CM

## 2019-10-11 DIAGNOSIS — E78.00 HYPERCHOLESTEROLEMIA: ICD-10-CM

## 2019-10-11 DIAGNOSIS — G47.33 OSA (OBSTRUCTIVE SLEEP APNEA): ICD-10-CM

## 2019-10-11 NOTE — TELEPHONE ENCOUNTER
----- Message from Morales Foreman MD sent at 10/11/2019 12:52 PM EDT -----  Regarding: Ap Campo have set him up for cardiac cath with Dr Pippa Galvan and Kael GALLO  ----- Message -----  From: Adele Earl MD  Sent: 10/10/2019  10:18 PM EDT  To: Morales Foreman MD

## 2019-10-11 NOTE — TELEPHONE ENCOUNTER
I spoke with the patient and he informed me that Fermin Li had called him earlier and things were in process.

## 2019-10-14 ENCOUNTER — HOSPITAL ENCOUNTER (OUTPATIENT)
Dept: LAB | Age: 49
Discharge: HOME | End: 2019-10-14
Payer: MEDICARE

## 2019-10-14 PROCEDURE — 85025 COMPLETE CBC W/AUTO DIFF WBC: CPT

## 2019-10-14 PROCEDURE — 80053 COMPREHEN METABOLIC PANEL: CPT

## 2019-10-14 PROCEDURE — 36415 COLL VENOUS BLD VENIPUNCTURE: CPT

## 2019-10-14 PROCEDURE — 85610 PROTHROMBIN TIME: CPT

## 2019-10-15 LAB
ALBUMIN SERPL-MCNC: 4.4 G/DL (ref 3.5–5.5)
ALBUMIN/GLOB SERPL: 1.9 {RATIO} (ref 1.2–2.2)
ALP SERPL-CCNC: 82 IU/L (ref 39–117)
ALT SERPL-CCNC: 24 IU/L (ref 0–44)
AST SERPL-CCNC: 17 IU/L (ref 0–40)
BASOPHILS # BLD AUTO: 0 X10E3/UL (ref 0–0.2)
BASOPHILS NFR BLD AUTO: 1 %
BILIRUB SERPL-MCNC: 0.6 MG/DL (ref 0–1.2)
BUN SERPL-MCNC: 10 MG/DL (ref 6–24)
BUN/CREAT SERPL: 12 (ref 9–20)
CALCIUM SERPL-MCNC: 9.3 MG/DL (ref 8.7–10.2)
CHLORIDE SERPL-SCNC: 99 MMOL/L (ref 96–106)
CO2 SERPL-SCNC: 26 MMOL/L (ref 20–29)
CREAT SERPL-MCNC: 0.84 MG/DL (ref 0.76–1.27)
EOSINOPHIL # BLD AUTO: 0.3 X10E3/UL (ref 0–0.4)
EOSINOPHIL NFR BLD AUTO: 6 %
ERYTHROCYTE [DISTWIDTH] IN BLOOD BY AUTOMATED COUNT: 13.1 % (ref 12.3–15.4)
GLOBULIN SER CALC-MCNC: 2.3 G/DL (ref 1.5–4.5)
GLUCOSE SERPL-MCNC: 236 MG/DL (ref 65–99)
HCT VFR BLD AUTO: 43.2 % (ref 37.5–51)
HGB BLD-MCNC: 15 G/DL (ref 13–17.7)
IMM GRANULOCYTES # BLD AUTO: 0 X10E3/UL (ref 0–0.1)
IMM GRANULOCYTES NFR BLD AUTO: 0 %
INR PPP: 1 (ref 0.8–1.2)
LYMPHOCYTES # BLD AUTO: 2.2 X10E3/UL (ref 0.7–3.1)
LYMPHOCYTES NFR BLD AUTO: 37 %
MCH RBC QN AUTO: 28.9 PG (ref 26.6–33)
MCHC RBC AUTO-ENTMCNC: 34.7 G/DL (ref 31.5–35.7)
MCV RBC AUTO: 83 FL (ref 79–97)
MONOCYTES # BLD AUTO: 0.5 X10E3/UL (ref 0.1–0.9)
MONOCYTES NFR BLD AUTO: 8 %
NEUTROPHILS # BLD AUTO: 2.8 X10E3/UL (ref 1.4–7)
NEUTROPHILS NFR BLD AUTO: 48 %
PLATELET # BLD AUTO: 218 X10E3/UL (ref 150–450)
POTASSIUM SERPL-SCNC: 4.2 MMOL/L (ref 3.5–5.2)
PROT SERPL-MCNC: 6.7 G/DL (ref 6–8.5)
PROTHROMBIN TIME: 10.6 SEC (ref 9.1–12)
RBC # BLD AUTO: 5.19 X10E6/UL (ref 4.14–5.8)
SODIUM SERPL-SCNC: 140 MMOL/L (ref 134–144)
WBC # BLD AUTO: 5.9 X10E3/UL (ref 3.4–10.8)

## 2019-10-16 ENCOUNTER — HOSPITAL ENCOUNTER (OUTPATIENT)
Age: 49
Discharge: HOME | End: 2019-10-17
Attending: INTERNAL MEDICINE | Admitting: INTERNAL MEDICINE
Payer: MEDICARE

## 2019-10-16 DIAGNOSIS — R07.9 CHEST PAIN, UNSPECIFIED TYPE: ICD-10-CM

## 2019-10-16 LAB
GLUCOSE BLD STRIP.AUTO-MCNC: 206 MG/DL (ref 65–100)
GLUCOSE BLD STRIP.AUTO-MCNC: 212 MG/DL (ref 65–100)
SERVICE CMNT-IMP: ABNORMAL
SERVICE CMNT-IMP: ABNORMAL

## 2019-10-16 PROCEDURE — C1874 STENT, COATED/COV W/DEL SYS: HCPCS | Performed by: INTERNAL MEDICINE

## 2019-10-16 PROCEDURE — C1769 GUIDE WIRE: HCPCS | Performed by: INTERNAL MEDICINE

## 2019-10-16 PROCEDURE — 77030013797 HC KT TRNSDUC PRSSR EDWD -A: Performed by: INTERNAL MEDICINE

## 2019-10-16 PROCEDURE — 74011636320 HC RX REV CODE- 636/320: Performed by: INTERNAL MEDICINE

## 2019-10-16 PROCEDURE — 74011000250 HC RX REV CODE- 250: Performed by: INTERNAL MEDICINE

## 2019-10-16 PROCEDURE — 99153 MOD SED SAME PHYS/QHP EA: CPT | Performed by: INTERNAL MEDICINE

## 2019-10-16 PROCEDURE — 74011636637 HC RX REV CODE- 636/637: Performed by: INTERNAL MEDICINE

## 2019-10-16 PROCEDURE — 92928 PRQ TCAT PLMT NTRAC ST 1 LES: CPT | Performed by: INTERNAL MEDICINE

## 2019-10-16 PROCEDURE — 74011250637 HC RX REV CODE- 250/637: Performed by: INTERNAL MEDICINE

## 2019-10-16 PROCEDURE — 93459 L HRT ART/GRFT ANGIO: CPT | Performed by: INTERNAL MEDICINE

## 2019-10-16 PROCEDURE — C1894 INTRO/SHEATH, NON-LASER: HCPCS | Performed by: INTERNAL MEDICINE

## 2019-10-16 PROCEDURE — 74011000258 HC RX REV CODE- 258: Performed by: INTERNAL MEDICINE

## 2019-10-16 PROCEDURE — 74011250636 HC RX REV CODE- 250/636: Performed by: INTERNAL MEDICINE

## 2019-10-16 PROCEDURE — 77030004549 HC CATH ANGI DX PRF MRTM -A: Performed by: INTERNAL MEDICINE

## 2019-10-16 PROCEDURE — 99152 MOD SED SAME PHYS/QHP 5/>YRS: CPT | Performed by: INTERNAL MEDICINE

## 2019-10-16 PROCEDURE — 74011250637 HC RX REV CODE- 250/637: Performed by: NURSE PRACTITIONER

## 2019-10-16 PROCEDURE — C1887 CATHETER, GUIDING: HCPCS | Performed by: INTERNAL MEDICINE

## 2019-10-16 PROCEDURE — C1725 CATH, TRANSLUMIN NON-LASER: HCPCS | Performed by: INTERNAL MEDICINE

## 2019-10-16 PROCEDURE — 82962 GLUCOSE BLOOD TEST: CPT

## 2019-10-16 DEVICE — STENT RONYX25026UX RESOLUTE ONYX 2.50X26
Type: IMPLANTABLE DEVICE | Status: FUNCTIONAL
Brand: RESOLUTE ONYX™

## 2019-10-16 RX ORDER — METOPROLOL TARTRATE 50 MG/1
100 TABLET ORAL 2 TIMES DAILY
Status: DISCONTINUED | OUTPATIENT
Start: 2019-10-16 | End: 2019-10-17 | Stop reason: HOSPADM

## 2019-10-16 RX ORDER — FENTANYL CITRATE 50 UG/ML
INJECTION, SOLUTION INTRAMUSCULAR; INTRAVENOUS AS NEEDED
Status: DISCONTINUED | OUTPATIENT
Start: 2019-10-16 | End: 2019-10-16 | Stop reason: HOSPADM

## 2019-10-16 RX ORDER — HEPARIN SODIUM 200 [USP'U]/100ML
INJECTION, SOLUTION INTRAVENOUS
Status: DISCONTINUED | OUTPATIENT
Start: 2019-10-16 | End: 2019-10-16

## 2019-10-16 RX ORDER — MIDAZOLAM HYDROCHLORIDE 1 MG/ML
INJECTION, SOLUTION INTRAMUSCULAR; INTRAVENOUS AS NEEDED
Status: DISCONTINUED | OUTPATIENT
Start: 2019-10-16 | End: 2019-10-16 | Stop reason: HOSPADM

## 2019-10-16 RX ORDER — ATORVASTATIN CALCIUM 40 MG/1
80 TABLET, FILM COATED ORAL DAILY
Status: DISCONTINUED | OUTPATIENT
Start: 2019-10-17 | End: 2019-10-17 | Stop reason: HOSPADM

## 2019-10-16 RX ORDER — ASPIRIN 81 MG/1
81 TABLET ORAL DAILY
Status: DISCONTINUED | OUTPATIENT
Start: 2019-10-17 | End: 2019-10-17 | Stop reason: HOSPADM

## 2019-10-16 RX ORDER — SODIUM CHLORIDE 0.9 % (FLUSH) 0.9 %
5-40 SYRINGE (ML) INJECTION EVERY 8 HOURS
Status: DISCONTINUED | OUTPATIENT
Start: 2019-10-16 | End: 2019-10-17 | Stop reason: HOSPADM

## 2019-10-16 RX ORDER — INSULIN LISPRO 100 [IU]/ML
3 INJECTION, SOLUTION INTRAVENOUS; SUBCUTANEOUS
Status: DISCONTINUED | OUTPATIENT
Start: 2019-10-16 | End: 2019-10-17 | Stop reason: HOSPADM

## 2019-10-16 RX ORDER — HYDROCORTISONE SODIUM SUCCINATE 100 MG/2ML
INJECTION, POWDER, FOR SOLUTION INTRAMUSCULAR; INTRAVENOUS AS NEEDED
Status: DISCONTINUED | OUTPATIENT
Start: 2019-10-16 | End: 2019-10-16

## 2019-10-16 RX ORDER — ACETAMINOPHEN 325 MG/1
650 TABLET ORAL
Status: DISCONTINUED | OUTPATIENT
Start: 2019-10-16 | End: 2019-10-17 | Stop reason: HOSPADM

## 2019-10-16 RX ORDER — INSULIN GLARGINE 100 [IU]/ML
24 INJECTION, SOLUTION SUBCUTANEOUS
Status: DISCONTINUED | OUTPATIENT
Start: 2019-10-16 | End: 2019-10-17 | Stop reason: HOSPADM

## 2019-10-16 RX ORDER — SODIUM CHLORIDE 0.9 % (FLUSH) 0.9 %
5-40 SYRINGE (ML) INJECTION AS NEEDED
Status: DISCONTINUED | OUTPATIENT
Start: 2019-10-16 | End: 2019-10-17 | Stop reason: HOSPADM

## 2019-10-16 RX ORDER — HYDRALAZINE HYDROCHLORIDE 25 MG/1
25 TABLET, FILM COATED ORAL 2 TIMES DAILY
Status: DISCONTINUED | OUTPATIENT
Start: 2019-10-16 | End: 2019-10-17 | Stop reason: HOSPADM

## 2019-10-16 RX ORDER — LIDOCAINE HYDROCHLORIDE 10 MG/ML
INJECTION, SOLUTION EPIDURAL; INFILTRATION; INTRACAUDAL; PERINEURAL AS NEEDED
Status: DISCONTINUED | OUTPATIENT
Start: 2019-10-16 | End: 2019-10-16

## 2019-10-16 RX ORDER — AMLODIPINE BESYLATE 5 MG/1
10 TABLET ORAL DAILY
Status: DISCONTINUED | OUTPATIENT
Start: 2019-10-17 | End: 2019-10-17 | Stop reason: HOSPADM

## 2019-10-16 RX ORDER — DIPHENHYDRAMINE HYDROCHLORIDE 50 MG/ML
INJECTION, SOLUTION INTRAMUSCULAR; INTRAVENOUS AS NEEDED
Status: DISCONTINUED | OUTPATIENT
Start: 2019-10-16 | End: 2019-10-16

## 2019-10-16 RX ORDER — GUAIFENESIN 100 MG/5ML
81 LIQUID (ML) ORAL ONCE
Status: COMPLETED | OUTPATIENT
Start: 2019-10-16 | End: 2019-10-16

## 2019-10-16 RX ADMIN — HYDRALAZINE HYDROCHLORIDE 25 MG: 25 TABLET, FILM COATED ORAL at 17:20

## 2019-10-16 RX ADMIN — ASPIRIN 81 MG CHEWABLE TABLET 81 MG: 81 TABLET CHEWABLE at 11:10

## 2019-10-16 RX ADMIN — INSULIN GLARGINE 24 UNITS: 100 INJECTION, SOLUTION SUBCUTANEOUS at 22:51

## 2019-10-16 RX ADMIN — BIVALIRUDIN 0.5 MG/KG/HR: 250 INJECTION, POWDER, LYOPHILIZED, FOR SOLUTION INTRAVENOUS at 14:03

## 2019-10-16 RX ADMIN — TICAGRELOR 90 MG: 90 TABLET ORAL at 22:51

## 2019-10-16 RX ADMIN — Medication 10 ML: at 22:51

## 2019-10-16 RX ADMIN — METOPROLOL TARTRATE 100 MG: 50 TABLET ORAL at 17:20

## 2019-10-16 NOTE — Clinical Note
Lesion: Located in the Mid Cx. Stent inserted. Stent deployed. Single technique used. First inflation pressure = 12 zita; inflation time: 15 sec.

## 2019-10-16 NOTE — Clinical Note
Single view of the aortic root obtained using power injection. Total volume = 30 mL. Rate = 15 mL/sec. Pressure = 900 PSI.

## 2019-10-16 NOTE — PROGRESS NOTES
Patient is s/p PCI/CLARKE to native Lcx.    Start on Brilinta 90mg BID, continue on ASA, BB, statin    Right groin site sheath remains in place, D/I, soft, no hematoma

## 2019-10-16 NOTE — Clinical Note
TRANSFER - OUT REPORT:  
 
Verbal report given to: Jose Rogers RN. Report consisted of patient's Situation, Background, Assessment and  
Recommendations(SBAR). Opportunity for questions and clarification was provided. Patient transported with a Registered Nurse and [address] [place] [address] / [facility] Tech. Patient transported to: [facility]CU.

## 2019-10-16 NOTE — Clinical Note
Lesion located in the Mid Cx. Balloon inserted. Balloon inflated using multiple inflations inflation technique. Pressure = 9 zita; Duration = 10 sec. Inflation 2: Pressure: 10 zita; Duration: 10 sec.

## 2019-10-16 NOTE — PROGRESS NOTES
6fr sheath removed from the right femoral artery, manual pressure and quickclot held for 15 min. Upon release no oozing or hematoma noted. Groin inspected and care taken over by RAMU Easton R.N.

## 2019-10-16 NOTE — Clinical Note
Lesion located in the Mid Cx. Balloon inserted. Balloon inflated using single inflation technique. Pressure = 12 zita; Duration = 10 sec.

## 2019-10-16 NOTE — PROGRESS NOTES
Bedside and Verbal shift change report given to Elisa Bennett (oncoming nurse) by Sommer Starkey (offgoing nurse). Report included the following information SBAR, Kardex, Intake/Output, MAR, Accordion and Recent Results.

## 2019-10-17 VITALS
SYSTOLIC BLOOD PRESSURE: 165 MMHG | WEIGHT: 206 LBS | RESPIRATION RATE: 18 BRPM | BODY MASS INDEX: 34.32 KG/M2 | HEIGHT: 65 IN | HEART RATE: 71 BPM | TEMPERATURE: 97.8 F | DIASTOLIC BLOOD PRESSURE: 91 MMHG | OXYGEN SATURATION: 100 %

## 2019-10-17 PROBLEM — Z95.5 S/P CORONARY ARTERY STENT PLACEMENT: Status: ACTIVE | Noted: 2019-10-17

## 2019-10-17 LAB
GLUCOSE BLD STRIP.AUTO-MCNC: 121 MG/DL (ref 65–100)
SERVICE CMNT-IMP: ABNORMAL

## 2019-10-17 PROCEDURE — 82962 GLUCOSE BLOOD TEST: CPT

## 2019-10-17 PROCEDURE — 74011250637 HC RX REV CODE- 250/637: Performed by: NURSE PRACTITIONER

## 2019-10-17 PROCEDURE — 74011250637 HC RX REV CODE- 250/637: Performed by: INTERNAL MEDICINE

## 2019-10-17 RX ORDER — LISINOPRIL 5 MG/1
2.5 TABLET ORAL DAILY
Status: DISCONTINUED | OUTPATIENT
Start: 2019-10-17 | End: 2019-10-17 | Stop reason: HOSPADM

## 2019-10-17 RX ORDER — ASPIRIN 81 MG/1
81 TABLET ORAL DAILY
Qty: 30 TAB | Refills: 11 | Status: SHIPPED | OUTPATIENT
Start: 2019-10-17

## 2019-10-17 RX ORDER — LISINOPRIL 2.5 MG/1
2.5 TABLET ORAL DAILY
Qty: 30 TAB | Refills: 11 | Status: SHIPPED | OUTPATIENT
Start: 2019-10-17

## 2019-10-17 RX ADMIN — HYDRALAZINE HYDROCHLORIDE 25 MG: 25 TABLET, FILM COATED ORAL at 09:34

## 2019-10-17 RX ADMIN — AMLODIPINE BESYLATE 10 MG: 5 TABLET ORAL at 09:35

## 2019-10-17 RX ADMIN — ATORVASTATIN CALCIUM 80 MG: 40 TABLET, FILM COATED ORAL at 09:35

## 2019-10-17 RX ADMIN — METOPROLOL TARTRATE 100 MG: 50 TABLET ORAL at 09:34

## 2019-10-17 RX ADMIN — ASPIRIN 81 MG: 81 TABLET, COATED ORAL at 09:35

## 2019-10-17 RX ADMIN — Medication 10 ML: at 06:00

## 2019-10-17 RX ADMIN — LISINOPRIL 2.5 MG: 5 TABLET ORAL at 10:23

## 2019-10-17 NOTE — PROGRESS NOTES
Problem: Falls - Risk of  Goal: *Absence of Falls  Description  Document Andrews Javed Fall Risk and appropriate interventions in the flowsheet.   Outcome: Progressing Towards Goal  Note:   Fall Risk Interventions:            Medication Interventions: Assess postural VS orthostatic hypotension                   Problem: Patient Education: Go to Patient Education Activity  Goal: Patient/Family Education  Outcome: Resolved/Met     Problem: Cath Lab Procedures: Pre-Procedure  Goal: Off Pathway (Use only if patient is Off Pathway)  Outcome: Resolved/Met  Goal: Activity/Safety  Outcome: Resolved/Met  Goal: Consults, if ordered  Outcome: Resolved/Met  Goal: Diagnostic Test/Procedures  Outcome: Resolved/Met  Goal: Nutrition/Diet  Outcome: Resolved/Met  Goal: Discharge Planning  Outcome: Resolved/Met  Goal: Medications  Outcome: Resolved/Met  Goal: Respiratory  Outcome: Resolved/Met  Goal: Treatments/Interventions/Procedures  Outcome: Resolved/Met  Goal: Psychosocial  Outcome: Resolved/Met  Goal: *Verbalize description of procedure  Outcome: Resolved/Met  Goal: *Consent signed  Outcome: Resolved/Met     Problem: Cath Lab Procedures: Post-Cath Day of Procedure (Initiate SCIP Measures for Post-Op Care)  Goal: Off Pathway (Use only if patient is Off Pathway)  Outcome: Resolved/Met  Goal: Activity/Safety  Outcome: Resolved/Met  Goal: Consults, if ordered  Outcome: Resolved/Met  Goal: Diagnostic Test/Procedures  Outcome: Resolved/Met  Goal: Nutrition/Diet  Outcome: Resolved/Met  Goal: Discharge Planning  Outcome: Resolved/Met  Goal: Medications  Outcome: Resolved/Met  Goal: Respiratory  Outcome: Resolved/Met  Goal: Treatments/Interventions/Procedures  Outcome: Resolved/Met  Goal: Psychosocial  Outcome: Resolved/Met  Goal: *Procedure site is without bleeding and signs of infection six hours post sheath removal  Outcome: Resolved/Met  Goal: *Hemodynamically stable  Outcome: Resolved/Met  Goal: *Optimal pain control at patient's stated goal  Outcome: Resolved/Met     Problem: Patient Education: Go to Patient Education Activity  Goal: Patient/Family Education  Outcome: Resolved/Met

## 2019-10-17 NOTE — PROGRESS NOTES
Assisted pt up out of bed. Pt ambulated to bathroom. Voided large amounts. Pt back to bed and sitting on side. Pt voiced concern about having SOB with his dose of Brilinta. Educated pt that some people do not have issues with SOB when taking with a caffinated drink. Pt was given 240 ml of Diet Pepsi with his night dose of Brilinta. Will monitor pt for adverse reaction of SOB.

## 2019-10-17 NOTE — CARDIO/PULMONARY
Cardiac Rehab Note: chart review S/p PCI/stenting 10/16/2019 Met with pt and sig other Smoking history assessed. Patient is a non smoker. EF 51%  on 10/8/2019 per nuc stress CAD education folder, with heart heathy diet, warning signs, heart facts, catheterization brochure, and out patient cardiac rehab program provided to [facility] on 10/17/2019 Educated using teach back method. Reviewed CAD diagnosis definition and purpose of intervention. Discussed risk factors for CAD to include the following: family history, elevated BMI, hyperlipidemia, hypertension, diabetes, and stress. Discussed Heart Healthy/Low Sodium (less than 2000 mg) diet. Reviewed the importance of medication(Brilinta) compliance, follow up appointments with cardiologist, signs and symptoms of angina, and what to report to physician after discharge. Emphasized the value of cardiac rehab. Discussed Cardiac Rehab Program format, benefits, and encouraged enrollment to assist with risk modification and management. States he has been through this before and has no new questions. He and his family member are planning to join a gym but state he is unable to commit to Cardiac Rehab at this time but will call us if he is able. [facility] verbalized understanding with questions answered.   José Antonio Deutsch RN

## 2019-10-17 NOTE — H&P
[address], [address] [place] [address] Ne, [address] [facility]  [phone]      Cardiology Progress Note      10/17/2019 9:24 AM    Admit Date: 10/16/2019    Admit Diagnosis:   Chest pain, unspecified type [R07.9]    Subjective:     Danny Piña has no c/o CP, SOB s/p PCI/CLARKE to native LCx. BP mildly elevated, continue on multiple meds    Visit Vitals  /90 (BP Patient Position: At rest)   Pulse 68   Temp 97.7 °F (36.5 °C)   Resp 17   Ht 5' 5\" (1.651 m)   Wt 93.4 kg (206 lb)   SpO2 98%   BMI 34.28 kg/m²       Current Facility-Administered Medications   Medication Dose Route Frequency    aspirin delayed-release tablet 81 mg  81 mg Oral DAILY    ticagrelor (BRILINTA) tablet 90 mg  90 mg Oral Q12H    amLODIPine (NORVASC) tablet 10 mg  10 mg Oral DAILY    atorvastatin (LIPITOR) tablet 80 mg  80 mg Oral DAILY    hydrALAZINE (APRESOLINE) tablet 25 mg  25 mg Oral BID    metoprolol tartrate (LOPRESSOR) tablet 100 mg  100 mg Oral BID    sodium chloride (NS) flush 5-40 mL  5-40 mL IntraVENous Q8H    sodium chloride (NS) flush 5-40 mL  5-40 mL IntraVENous PRN    acetaminophen (TYLENOL) tablet 650 mg  650 mg Oral Q4H PRN    insulin glargine (LANTUS) injection 24 Units  24 Units SubCUTAneous QHS    insulin lispro (HUMALOG) injection 3 Units  3 Units SubCUTAneous TIDAC       Objective:      Physical Exam:  General Appearance:  slightly obese AAM in no acute distress  Chest:   Clear  Cardiovascular:  Regular rate and rhythm, no murmur. Abdomen:   Soft, non-tender, bowel sounds are active. Extremities: right groin site D/I, no hematoma, +DP/PT  Skin:  Warm and dry. Data Review:   No results for input(s): WBC, HGB, HCT, PLT, HGBEXT, HCTEXT, PLTEXT in the last 72 hours. No results for input(s): NA, K, CL, CO2, GLU, BUN, CREA, CA, MG, PHOS, ALB, TBIL, TBILI, SGOT, ALT, INR, INREXT in the last 72 hours. No results for input(s): TROIQ, CPK, CKMB in the last 72 hours.       Intake/Output Summary (Last 24 hours) at 10/17/2019 1656  Last data filed at 10/17/2019 0446  Gross per 24 hour   Intake 500 ml   Output    Net 500 ml        Telemetry: Sr      Assessment:     Active Problems:    Diabetes (HCC) ()      Hypercholesterolemia ()      Hypertension ()      Hx of CABG (1/1/2007)      Overview: [facility]      S/P coronary artery stent placement (10/17/2019)      Overview: 10/16/19 PCI/CALRKE to native LCX        Plan:     CAD with hx of CABG and angina III:  S/p PCI/CLARKE to LCX native  Start Brilinta 90mg BID x 1 year, changed ASA 325mg to 81mg daily, continue on BB, statin    HTN:  Elevated BP, continue on BB, hydralazine, Norvasc  With hx of DM, and normal renal function, starting ACEI low dose    F/u with Dr. Tre Winter 10/31/19      Jaylan Brown [facility]  Cardiology      [facility] Cardiology    10/17/2019         Patient seen, examined by me personally. Plan discussed as detailed. Agree with note as outlined by  NP. I confirm findings in history and physical exam. No additional findings noted. Agree with plan as outlined above.      Tapan Ruiz MD

## 2019-12-23 RX ORDER — SILDENAFIL 100 MG/1
100 TABLET, FILM COATED ORAL AS NEEDED
Qty: 10 TAB | Refills: 11 | Status: SHIPPED | OUTPATIENT
Start: 2019-12-23

## (undated) DEVICE — PRESSURE MONITORING SET: Brand: TRUWAVE

## (undated) DEVICE — SNARE ENDOSCP M L240CM W27MM SHTH DIA2.4MM CHN 2.8MM OVL

## (undated) DEVICE — QUILTED PREMIUM COMFORT UNDERPAD,EXTRA HEAVY: Brand: WINGS

## (undated) DEVICE — SYRINGE CATH TIP 50ML

## (undated) DEVICE — CATHETER ETER CARD MULTIPAK MULTIPAK 5FR PERFORMA

## (undated) DEVICE — CATH IV AUTOGRD BC BLU 22GA 25 -- INSYTE

## (undated) DEVICE — Device: Brand: ASAHI SION BLUE

## (undated) DEVICE — KENDALL RADIOLUCENT FOAM MONITORING ELECTRODE -RECTANGULAR SHAPE: Brand: KENDALL

## (undated) DEVICE — Device: Brand: MEDICAL ACTION INDUSTRIES

## (undated) DEVICE — AIRLIFE™ U/CONNECT-IT OXYGEN TUBING 7 FEET (2.1 M) CRUSH-RESISTANT OXYGEN TUBING, VINYL TIPPED: Brand: AIRLIFE™

## (undated) DEVICE — PACK PROCEDURE SURG HRT CATH

## (undated) DEVICE — BW-412T DISP COMBO CLEANING BRUSH: Brand: SINGLE USE COMBINATION CLEANING BRUSH

## (undated) DEVICE — REM POLYHESIVE ADULT PATIENT RETURN ELECTRODE: Brand: VALLEYLAB

## (undated) DEVICE — SOLIDIFIER FLUID 3000 CC ABSORB

## (undated) DEVICE — CLIP MED L235CM L2.8MM 11MM OPN HEMSTAT FIX RESOL

## (undated) DEVICE — SYRINGE ANGIO 12ML COR CTRL ROT ADPT SLD PLUNG CLR BRL M

## (undated) DEVICE — PINNACLE INTRODUCER SHEATH: Brand: PINNACLE

## (undated) DEVICE — CONNECTOR TBNG AUX H2O JET DISP FOR OLY 160/180 SER

## (undated) DEVICE — CANN NASAL O2 CAPNOGRAPHY AD -- FILTERLINE

## (undated) DEVICE — 3M™ TEGADERM™ TRANSPARENT FILM DRESSING FRAME STYLE, 1626W, 4 IN X 4-3/4 IN (10 CM X 12 CM), 50/CT 4CT/CASE: Brand: 3M™ TEGADERM™

## (undated) DEVICE — CONTAINER SPEC 20 ML LID NEUT BUFF FORMALIN 10 % POLYPR STS

## (undated) DEVICE — GUIDEWIRE VASC L145CM 0.035IN J TIP L3MM PTFE FIX COR NAMIC

## (undated) DEVICE — CATH GUID COR EB35 6FR 100CM -- LAUNCHER

## (undated) DEVICE — Z DISCONTINUED NO SUB IDED SET EXTN W/ 4 W STPCOCK M SPIN LOK 36IN

## (undated) DEVICE — 1200 GUARD II KIT W/5MM TUBE W/O VAC TUBE: Brand: GUARDIAN

## (undated) DEVICE — TRAP SURG QUAD PARABOLA SLOT DSGN SFTY SCRN TRAPEASE

## (undated) DEVICE — CATHETER ANGIO LCB AD 5 FRX100 CM PERFORMA

## (undated) DEVICE — SYR ART 700 CLEAR MARK 7 -- ARTERION

## (undated) DEVICE — Z DISCONTINUED USE 2751540 TUBING IRRIG L10IN DISP PMP ENDOGATOR

## (undated) DEVICE — NEONATAL-ADULT SPO2 SENSOR: Brand: NELLCOR

## (undated) DEVICE — TREK CORONARY DILATATION CATHETER 2.50 MM X 12 MM / RAPID-EXCHANGE: Brand: TREK

## (undated) DEVICE — NEEDLE HYPO 18GA L1.5IN PNK S STL HUB POLYPR SHLD REG BVL

## (undated) DEVICE — RUNTHROUGH NS EXTRA FLOPPY PTCA GUIDEWIRE: Brand: RUNTHROUGH

## (undated) DEVICE — SET ADMIN 16ML TBNG L100IN 2 Y INJ SITE IV PIGGY BK DISP

## (undated) DEVICE — ANGIOGRAPHY KIT CUST [K0910930B] [MERIT MEDICAL SYSTEMS INC]

## (undated) DEVICE — SYRINGE MED 20ML STD CLR PLAS LUERLOCK TIP N CTRL DISP

## (undated) DEVICE — ENDO CARRY-ON PROCEDURE KIT INCLUDES ENZYMATIC SPONGE, GAUZE, BIOHAZARD LABEL, TRAY, LUBRICANT, DIRTY SCOPE LABEL, WATER LABEL, TRAY, DRAWSTRING PAD, AND DEFENDO 4-PIECE KIT.: Brand: ENDO CARRY-ON PROCEDURE KIT

## (undated) DEVICE — NC TREK CORONARY DILATATION CATHETER 2.75 MM X 12 MM / RAPID-EXCHANGE: Brand: NC TREK

## (undated) DEVICE — BAG BELONG PT PERS CLEAR HANDL

## (undated) DEVICE — Device: Brand: PROWATER

## (undated) DEVICE — BAG SPEC BIOHZD LF 2MIL 6X10IN -- CONVERT TO ITEM 357326

## (undated) DEVICE — CUFF BLD PRSS CHILD SZ 9 FOR 15-21CM LIMB VYN SFT W/O TB